# Patient Record
Sex: FEMALE | Race: WHITE | Employment: OTHER | URBAN - METROPOLITAN AREA
[De-identification: names, ages, dates, MRNs, and addresses within clinical notes are randomized per-mention and may not be internally consistent; named-entity substitution may affect disease eponyms.]

---

## 2019-10-18 ENCOUNTER — HOSPITAL ENCOUNTER (EMERGENCY)
Facility: HOSPITAL | Age: 59
Discharge: HOME | End: 2019-10-18
Attending: EMERGENCY MEDICINE
Payer: COMMERCIAL

## 2019-10-18 VITALS
WEIGHT: 220 LBS | OXYGEN SATURATION: 98 % | HEART RATE: 76 BPM | BODY MASS INDEX: 34.53 KG/M2 | RESPIRATION RATE: 16 BRPM | DIASTOLIC BLOOD PRESSURE: 99 MMHG | SYSTOLIC BLOOD PRESSURE: 154 MMHG | TEMPERATURE: 98 F | HEIGHT: 67 IN

## 2019-10-18 DIAGNOSIS — H57.12 PAIN OF LEFT EYE: Primary | ICD-10-CM

## 2019-10-18 PROCEDURE — 99283 EMERGENCY DEPT VISIT LOW MDM: CPT

## 2019-10-18 RX ORDER — ERYTHROMYCIN 5 MG/G
1 OINTMENT OPHTHALMIC EVERY 6 HOURS
Qty: 3.5 G | Refills: 0 | Status: SHIPPED | OUTPATIENT
Start: 2019-10-18 | End: 2019-10-22

## 2019-10-18 RX ORDER — TETRACAINE HYDROCHLORIDE 5 MG/ML
1-2 SOLUTION OPHTHALMIC ONCE
Status: COMPLETED | OUTPATIENT
Start: 2019-10-18 | End: 2019-10-18

## 2019-10-18 RX ORDER — TETRACAINE HYDROCHLORIDE 5 MG/ML
SOLUTION OPHTHALMIC
Status: COMPLETED
Start: 2019-10-18 | End: 2019-10-18

## 2019-10-19 NOTE — ED INITIAL ASSESSMENT (HPI)
Pt was taking contact out of left eye and scratched her eye with her fingernail. Pt states pain, sensitivity to light and \"feels like something is in my eye\".

## 2019-10-19 NOTE — ED PROVIDER NOTES
Patient Seen in: [facility] Emergency Department      History   Patient presents with:   Eye Visual Problem (opthalmic)    Stated Complaint: scratched eye    HPI    CHIEF COMPLAINT: Left eye pain    HISTORY OF PRESENT ILLNESS: Patient is a 59-year-ol Acuity: Recorded by the nurses and reviewed. 20/25 bilaterally, corrected    Left eye: No periorbital edema, ecchymosis, erythema or induration present. The lids show no evidence of laceration or stye.  The upper and lower lids were everted and there was n comfortable going home.               Disposition and Plan     Clinical Impression:  Pain of left eye  (primary encounter diagnosis)    Disposition:  Discharge  10/18/2019  7:48 pm    Follow-up:  MD Santa Monique

## 2019-10-19 NOTE — ED PROVIDER NOTES
I reviewed that chart and discussed the case. I have examined the patient and noted 51-year-old female that states she thinks she scratched her left eye while taking out her contact. The contact was intact.   She did this just prior to arrival.  She is co

## 2020-01-27 PROBLEM — I10 HYPERTENSION, UNCONTROLLED: Status: ACTIVE | Noted: 2020-01-27

## 2020-01-27 PROBLEM — Z82.49 FAMILY HISTORY OF EARLY CAD: Status: ACTIVE | Noted: 2020-01-27

## 2020-01-27 PROBLEM — E66.9 OBESITY (BMI 30-39.9): Status: ACTIVE | Noted: 2020-01-27

## 2024-09-16 ENCOUNTER — APPOINTMENT (OUTPATIENT)
Dept: CT IMAGING | Facility: HOSPITAL | Age: 64
End: 2024-09-16
Attending: EMERGENCY MEDICINE
Payer: COMMERCIAL

## 2024-09-16 ENCOUNTER — HOSPITAL ENCOUNTER (INPATIENT)
Facility: HOSPITAL | Age: 64
LOS: 2 days | Discharge: HOME | End: 2024-09-19
Attending: EMERGENCY MEDICINE | Admitting: HOSPITALIST
Payer: COMMERCIAL

## 2024-09-16 DIAGNOSIS — R10.9 INTRACTABLE ABDOMINAL PAIN: ICD-10-CM

## 2024-09-16 DIAGNOSIS — K80.20 GALLSTONES: Primary | ICD-10-CM

## 2024-09-16 DIAGNOSIS — N20.0 KIDNEY STONE: ICD-10-CM

## 2024-09-16 DIAGNOSIS — N23 RENAL COLIC: ICD-10-CM

## 2024-09-16 LAB
ALBUMIN SERPL-MCNC: 4.9 G/DL (ref 3.2–4.8)
ALBUMIN/GLOB SERPL: 1.5 {RATIO} (ref 1–2)
ALP LIVER SERPL-CCNC: 92 U/L
ALT SERPL-CCNC: 12 U/L
ANION GAP SERPL CALC-SCNC: 9 MMOL/L (ref 0–18)
AST SERPL-CCNC: 23 U/L (ref ?–34)
BASOPHILS # BLD AUTO: 0.07 X10(3) UL (ref 0–0.2)
BASOPHILS NFR BLD AUTO: 0.5 %
BILIRUB SERPL-MCNC: 0.5 MG/DL (ref 0.2–1.1)
BUN BLD-MCNC: 15 MG/DL (ref 9–23)
CALCIUM BLD-MCNC: 11 MG/DL (ref 8.7–10.4)
CHLORIDE SERPL-SCNC: 105 MMOL/L (ref 98–112)
CO2 SERPL-SCNC: 26 MMOL/L (ref 21–32)
CREAT BLD-MCNC: 1.16 MG/DL
EGFRCR SERPLBLD CKD-EPI 2021: 53 ML/MIN/1.73M2 (ref 60–?)
EOSINOPHIL # BLD AUTO: 0.03 X10(3) UL (ref 0–0.7)
EOSINOPHIL NFR BLD AUTO: 0.2 %
ERYTHROCYTE [DISTWIDTH] IN BLOOD BY AUTOMATED COUNT: 12.9 %
GLOBULIN PLAS-MCNC: 3.2 G/DL (ref 2–3.5)
GLUCOSE BLD-MCNC: 145 MG/DL (ref 70–99)
HCT VFR BLD AUTO: 42.2 %
HGB BLD-MCNC: 14.6 G/DL
IMM GRANULOCYTES # BLD AUTO: 0.05 X10(3) UL (ref 0–1)
IMM GRANULOCYTES NFR BLD: 0.4 %
LIPASE SERPL-CCNC: 48 U/L (ref 12–53)
LYMPHOCYTES # BLD AUTO: 1.06 X10(3) UL (ref 1–4)
LYMPHOCYTES NFR BLD AUTO: 7.7 %
MCH RBC QN AUTO: 30.7 PG (ref 26–34)
MCHC RBC AUTO-ENTMCNC: 34.6 G/DL (ref 31–37)
MCV RBC AUTO: 88.8 FL
MONOCYTES # BLD AUTO: 0.45 X10(3) UL (ref 0.1–1)
MONOCYTES NFR BLD AUTO: 3.3 %
NEUTROPHILS # BLD AUTO: 12.13 X10 (3) UL (ref 1.5–7.7)
NEUTROPHILS # BLD AUTO: 12.13 X10(3) UL (ref 1.5–7.7)
NEUTROPHILS NFR BLD AUTO: 87.9 %
OSMOLALITY SERPL CALC.SUM OF ELEC: 293 MOSM/KG (ref 275–295)
PLATELET # BLD AUTO: 247 10(3)UL (ref 150–450)
POTASSIUM SERPL-SCNC: 4.1 MMOL/L (ref 3.5–5.1)
PROT SERPL-MCNC: 8.1 G/DL (ref 5.7–8.2)
RBC # BLD AUTO: 4.75 X10(6)UL
SODIUM SERPL-SCNC: 140 MMOL/L (ref 136–145)
WBC # BLD AUTO: 13.8 X10(3) UL (ref 4–11)

## 2024-09-16 PROCEDURE — 83690 ASSAY OF LIPASE: CPT | Performed by: EMERGENCY MEDICINE

## 2024-09-16 PROCEDURE — 85025 COMPLETE CBC W/AUTO DIFF WBC: CPT | Performed by: EMERGENCY MEDICINE

## 2024-09-16 PROCEDURE — 83690 ASSAY OF LIPASE: CPT

## 2024-09-16 PROCEDURE — 80053 COMPREHEN METABOLIC PANEL: CPT | Performed by: EMERGENCY MEDICINE

## 2024-09-16 PROCEDURE — 96361 HYDRATE IV INFUSION ADD-ON: CPT

## 2024-09-16 PROCEDURE — 96374 THER/PROPH/DIAG INJ IV PUSH: CPT

## 2024-09-16 PROCEDURE — 96375 TX/PRO/DX INJ NEW DRUG ADDON: CPT

## 2024-09-16 PROCEDURE — 99285 EMERGENCY DEPT VISIT HI MDM: CPT

## 2024-09-16 PROCEDURE — 85025 COMPLETE CBC W/AUTO DIFF WBC: CPT

## 2024-09-16 PROCEDURE — 80053 COMPREHEN METABOLIC PANEL: CPT

## 2024-09-16 PROCEDURE — 74177 CT ABD & PELVIS W/CONTRAST: CPT | Performed by: EMERGENCY MEDICINE

## 2024-09-16 RX ORDER — ONDANSETRON 2 MG/ML
4 INJECTION INTRAMUSCULAR; INTRAVENOUS ONCE
Status: DISCONTINUED | OUTPATIENT
Start: 2024-09-16 | End: 2024-09-19

## 2024-09-16 RX ORDER — HYDROMORPHONE HYDROCHLORIDE 1 MG/ML
0.5 INJECTION, SOLUTION INTRAMUSCULAR; INTRAVENOUS; SUBCUTANEOUS ONCE
Status: COMPLETED | OUTPATIENT
Start: 2024-09-16 | End: 2024-09-16

## 2024-09-16 RX ORDER — ONDANSETRON 2 MG/ML
4 INJECTION INTRAMUSCULAR; INTRAVENOUS ONCE
Status: COMPLETED | OUTPATIENT
Start: 2024-09-16 | End: 2024-09-16

## 2024-09-17 ENCOUNTER — ANESTHESIA (OUTPATIENT)
Dept: SURGERY | Facility: HOSPITAL | Age: 64
End: 2024-09-17
Payer: COMMERCIAL

## 2024-09-17 ENCOUNTER — ANESTHESIA EVENT (OUTPATIENT)
Dept: SURGERY | Facility: HOSPITAL | Age: 64
End: 2024-09-17
Payer: COMMERCIAL

## 2024-09-17 ENCOUNTER — APPOINTMENT (OUTPATIENT)
Dept: GENERAL RADIOLOGY | Facility: HOSPITAL | Age: 64
End: 2024-09-17
Attending: UROLOGY
Payer: COMMERCIAL

## 2024-09-17 PROBLEM — K80.20 GALLSTONES: Status: ACTIVE | Noted: 2024-09-17

## 2024-09-17 PROBLEM — R10.9 INTRACTABLE ABDOMINAL PAIN: Status: ACTIVE | Noted: 2024-09-17

## 2024-09-17 PROBLEM — N20.0 KIDNEY STONE: Status: ACTIVE | Noted: 2024-09-17

## 2024-09-17 PROBLEM — N23 RENAL COLIC: Status: ACTIVE | Noted: 2024-09-17

## 2024-09-17 LAB
ALBUMIN SERPL-MCNC: 4 G/DL (ref 3.2–4.8)
ALBUMIN/GLOB SERPL: 1.5 {RATIO} (ref 1–2)
ALP LIVER SERPL-CCNC: 75 U/L
ALT SERPL-CCNC: 23 U/L
ANION GAP SERPL CALC-SCNC: 9 MMOL/L (ref 0–18)
AST SERPL-CCNC: 33 U/L (ref ?–34)
BILIRUB SERPL-MCNC: 0.5 MG/DL (ref 0.2–1.1)
BILIRUB UR QL STRIP.AUTO: NEGATIVE
BUN BLD-MCNC: 15 MG/DL (ref 9–23)
CALCIUM BLD-MCNC: 9.5 MG/DL (ref 8.7–10.4)
CHLORIDE SERPL-SCNC: 105 MMOL/L (ref 98–112)
CLARITY UR REFRACT.AUTO: CLEAR
CO2 SERPL-SCNC: 25 MMOL/L (ref 21–32)
CREAT BLD-MCNC: 1.38 MG/DL
EGFRCR SERPLBLD CKD-EPI 2021: 43 ML/MIN/1.73M2 (ref 60–?)
ERYTHROCYTE [DISTWIDTH] IN BLOOD BY AUTOMATED COUNT: 13.2 %
GLOBULIN PLAS-MCNC: 2.6 G/DL (ref 2–3.5)
GLUCOSE BLD-MCNC: 132 MG/DL (ref 70–99)
GLUCOSE UR STRIP.AUTO-MCNC: NORMAL MG/DL
HCT VFR BLD AUTO: 37 %
HGB BLD-MCNC: 12.8 G/DL
KETONES UR STRIP.AUTO-MCNC: NEGATIVE MG/DL
LEUKOCYTE ESTERASE UR QL STRIP.AUTO: 25
MAGNESIUM SERPL-MCNC: 1.6 MG/DL (ref 1.6–2.6)
MCH RBC QN AUTO: 30.8 PG (ref 26–34)
MCHC RBC AUTO-ENTMCNC: 34.6 G/DL (ref 31–37)
MCV RBC AUTO: 88.9 FL
OSMOLALITY SERPL CALC.SUM OF ELEC: 291 MOSM/KG (ref 275–295)
PH UR STRIP.AUTO: 6.5 [PH] (ref 5–8)
PLATELET # BLD AUTO: 185 10(3)UL (ref 150–450)
POTASSIUM SERPL-SCNC: 4 MMOL/L (ref 3.5–5.1)
PROT SERPL-MCNC: 6.6 G/DL (ref 5.7–8.2)
PROT UR STRIP.AUTO-MCNC: 20 MG/DL
RBC # BLD AUTO: 4.16 X10(6)UL
RBC #/AREA URNS AUTO: >10 /HPF
SODIUM SERPL-SCNC: 139 MMOL/L (ref 136–145)
SP GR UR STRIP.AUTO: >1.03 (ref 1–1.03)
UROBILINOGEN UR STRIP.AUTO-MCNC: NORMAL MG/DL
WBC # BLD AUTO: 17.6 X10(3) UL (ref 4–11)

## 2024-09-17 PROCEDURE — 87186 SC STD MICRODIL/AGAR DIL: CPT | Performed by: EMERGENCY MEDICINE

## 2024-09-17 PROCEDURE — 96375 TX/PRO/DX INJ NEW DRUG ADDON: CPT

## 2024-09-17 PROCEDURE — 87040 BLOOD CULTURE FOR BACTERIA: CPT | Performed by: HOSPITALIST

## 2024-09-17 PROCEDURE — 87077 CULTURE AEROBIC IDENTIFY: CPT | Performed by: EMERGENCY MEDICINE

## 2024-09-17 PROCEDURE — 81001 URINALYSIS AUTO W/SCOPE: CPT | Performed by: EMERGENCY MEDICINE

## 2024-09-17 PROCEDURE — 80053 COMPREHEN METABOLIC PANEL: CPT | Performed by: HOSPITALIST

## 2024-09-17 PROCEDURE — 87086 URINE CULTURE/COLONY COUNT: CPT | Performed by: EMERGENCY MEDICINE

## 2024-09-17 PROCEDURE — 83735 ASSAY OF MAGNESIUM: CPT | Performed by: HOSPITALIST

## 2024-09-17 PROCEDURE — 87088 URINE BACTERIA CULTURE: CPT | Performed by: EMERGENCY MEDICINE

## 2024-09-17 PROCEDURE — 0T768DZ DILATION OF RIGHT URETER WITH INTRALUMINAL DEVICE, VIA NATURAL OR ARTIFICIAL OPENING ENDOSCOPIC: ICD-10-PCS | Performed by: UROLOGY

## 2024-09-17 PROCEDURE — 96376 TX/PRO/DX INJ SAME DRUG ADON: CPT

## 2024-09-17 PROCEDURE — 76000 FLUOROSCOPY <1 HR PHYS/QHP: CPT | Performed by: UROLOGY

## 2024-09-17 PROCEDURE — 85027 COMPLETE CBC AUTOMATED: CPT | Performed by: HOSPITALIST

## 2024-09-17 DEVICE — URETERAL STENT
Type: IMPLANTABLE DEVICE | Site: URETER | Status: FUNCTIONAL
Brand: ASCERTA™

## 2024-09-17 RX ORDER — PROCHLORPERAZINE EDISYLATE 5 MG/ML
5 INJECTION INTRAMUSCULAR; INTRAVENOUS EVERY 8 HOURS PRN
Status: DISCONTINUED | OUTPATIENT
Start: 2024-09-17 | End: 2024-09-19

## 2024-09-17 RX ORDER — ACETAMINOPHEN 500 MG
500 TABLET ORAL EVERY 4 HOURS PRN
Status: DISCONTINUED | OUTPATIENT
Start: 2024-09-17 | End: 2024-09-19

## 2024-09-17 RX ORDER — ONDANSETRON 2 MG/ML
4 INJECTION INTRAMUSCULAR; INTRAVENOUS EVERY 6 HOURS PRN
Status: DISCONTINUED | OUTPATIENT
Start: 2024-09-17 | End: 2024-09-19

## 2024-09-17 RX ORDER — HYDRALAZINE HYDROCHLORIDE 20 MG/ML
10 INJECTION INTRAMUSCULAR; INTRAVENOUS EVERY 6 HOURS PRN
Status: DISCONTINUED | OUTPATIENT
Start: 2024-09-17 | End: 2024-09-19

## 2024-09-17 RX ORDER — HYDROMORPHONE HYDROCHLORIDE 1 MG/ML
0.5 INJECTION, SOLUTION INTRAMUSCULAR; INTRAVENOUS; SUBCUTANEOUS ONCE
Status: COMPLETED | OUTPATIENT
Start: 2024-09-17 | End: 2024-09-17

## 2024-09-17 RX ORDER — HYDROCODONE BITARTRATE AND ACETAMINOPHEN 5; 325 MG/1; MG/1
2 TABLET ORAL ONCE AS NEEDED
Status: DISCONTINUED | OUTPATIENT
Start: 2024-09-17 | End: 2024-09-17 | Stop reason: HOSPADM

## 2024-09-17 RX ORDER — KETAMINE HYDROCHLORIDE 50 MG/ML
INJECTION, SOLUTION INTRAMUSCULAR; INTRAVENOUS AS NEEDED
Status: DISCONTINUED | OUTPATIENT
Start: 2024-09-17 | End: 2024-09-17 | Stop reason: SURG

## 2024-09-17 RX ORDER — SODIUM PHOSPHATE, DIBASIC AND SODIUM PHOSPHATE, MONOBASIC 7; 19 G/230ML; G/230ML
1 ENEMA RECTAL ONCE AS NEEDED
Status: DISCONTINUED | OUTPATIENT
Start: 2024-09-17 | End: 2024-09-19

## 2024-09-17 RX ORDER — HYDROCODONE BITARTRATE AND ACETAMINOPHEN 5; 325 MG/1; MG/1
1 TABLET ORAL EVERY 4 HOURS PRN
Status: DISCONTINUED | OUTPATIENT
Start: 2024-09-17 | End: 2024-09-19

## 2024-09-17 RX ORDER — SODIUM CHLORIDE, SODIUM LACTATE, POTASSIUM CHLORIDE, CALCIUM CHLORIDE 600; 310; 30; 20 MG/100ML; MG/100ML; MG/100ML; MG/100ML
INJECTION, SOLUTION INTRAVENOUS CONTINUOUS PRN
Status: DISCONTINUED | OUTPATIENT
Start: 2024-09-17 | End: 2024-09-17 | Stop reason: SURG

## 2024-09-17 RX ORDER — MAGNESIUM OXIDE 400 MG/1
400 TABLET ORAL ONCE
Status: COMPLETED | OUTPATIENT
Start: 2024-09-17 | End: 2024-09-17

## 2024-09-17 RX ORDER — NALOXONE HYDROCHLORIDE 0.4 MG/ML
0.08 INJECTION, SOLUTION INTRAMUSCULAR; INTRAVENOUS; SUBCUTANEOUS AS NEEDED
Status: DISCONTINUED | OUTPATIENT
Start: 2024-09-17 | End: 2024-09-17 | Stop reason: HOSPADM

## 2024-09-17 RX ORDER — LIDOCAINE HYDROCHLORIDE 20 MG/ML
JELLY TOPICAL AS NEEDED
Status: DISCONTINUED | OUTPATIENT
Start: 2024-09-17 | End: 2024-09-17 | Stop reason: HOSPADM

## 2024-09-17 RX ORDER — HYDROCODONE BITARTRATE AND ACETAMINOPHEN 5; 325 MG/1; MG/1
1 TABLET ORAL ONCE AS NEEDED
Status: DISCONTINUED | OUTPATIENT
Start: 2024-09-17 | End: 2024-09-17 | Stop reason: HOSPADM

## 2024-09-17 RX ORDER — LIDOCAINE HYDROCHLORIDE 10 MG/ML
INJECTION, SOLUTION EPIDURAL; INFILTRATION; INTRACAUDAL; PERINEURAL AS NEEDED
Status: DISCONTINUED | OUTPATIENT
Start: 2024-09-17 | End: 2024-09-17 | Stop reason: SURG

## 2024-09-17 RX ORDER — HYDROMORPHONE HYDROCHLORIDE 1 MG/ML
0.5 INJECTION, SOLUTION INTRAMUSCULAR; INTRAVENOUS; SUBCUTANEOUS
Status: DISCONTINUED | OUTPATIENT
Start: 2024-09-17 | End: 2024-09-19

## 2024-09-17 RX ORDER — SODIUM CHLORIDE 9 MG/ML
INJECTION, SOLUTION INTRAVENOUS CONTINUOUS
Status: DISCONTINUED | OUTPATIENT
Start: 2024-09-17 | End: 2024-09-19

## 2024-09-17 RX ORDER — MIDAZOLAM HYDROCHLORIDE 1 MG/ML
INJECTION INTRAMUSCULAR; INTRAVENOUS AS NEEDED
Status: DISCONTINUED | OUTPATIENT
Start: 2024-09-17 | End: 2024-09-17 | Stop reason: SURG

## 2024-09-17 RX ORDER — HEPARIN SODIUM 5000 [USP'U]/ML
5000 INJECTION, SOLUTION INTRAVENOUS; SUBCUTANEOUS EVERY 8 HOURS SCHEDULED
Status: DISCONTINUED | OUTPATIENT
Start: 2024-09-17 | End: 2024-09-18

## 2024-09-17 RX ORDER — HYDROMORPHONE HYDROCHLORIDE 1 MG/ML
0.6 INJECTION, SOLUTION INTRAMUSCULAR; INTRAVENOUS; SUBCUTANEOUS EVERY 5 MIN PRN
Status: DISCONTINUED | OUTPATIENT
Start: 2024-09-17 | End: 2024-09-17 | Stop reason: HOSPADM

## 2024-09-17 RX ORDER — SENNOSIDES 8.6 MG
17.2 TABLET ORAL NIGHTLY PRN
Status: DISCONTINUED | OUTPATIENT
Start: 2024-09-17 | End: 2024-09-19

## 2024-09-17 RX ORDER — ONDANSETRON 2 MG/ML
4 INJECTION INTRAMUSCULAR; INTRAVENOUS EVERY 4 HOURS PRN
Status: ACTIVE | OUTPATIENT
Start: 2024-09-17 | End: 2024-09-17

## 2024-09-17 RX ORDER — DIPHENHYDRAMINE HYDROCHLORIDE 50 MG/ML
12.5 INJECTION INTRAMUSCULAR; INTRAVENOUS AS NEEDED
Status: DISCONTINUED | OUTPATIENT
Start: 2024-09-17 | End: 2024-09-17 | Stop reason: HOSPADM

## 2024-09-17 RX ORDER — BISACODYL 10 MG
10 SUPPOSITORY, RECTAL RECTAL
Status: DISCONTINUED | OUTPATIENT
Start: 2024-09-17 | End: 2024-09-19

## 2024-09-17 RX ORDER — HYDROMORPHONE HYDROCHLORIDE 1 MG/ML
0.4 INJECTION, SOLUTION INTRAMUSCULAR; INTRAVENOUS; SUBCUTANEOUS EVERY 5 MIN PRN
Status: DISCONTINUED | OUTPATIENT
Start: 2024-09-17 | End: 2024-09-17 | Stop reason: HOSPADM

## 2024-09-17 RX ORDER — HYDROMORPHONE HYDROCHLORIDE 1 MG/ML
1 INJECTION, SOLUTION INTRAMUSCULAR; INTRAVENOUS; SUBCUTANEOUS ONCE
Status: DISCONTINUED | OUTPATIENT
Start: 2024-09-17 | End: 2024-09-17

## 2024-09-17 RX ORDER — PROCHLORPERAZINE EDISYLATE 5 MG/ML
5 INJECTION INTRAMUSCULAR; INTRAVENOUS EVERY 8 HOURS PRN
Status: DISCONTINUED | OUTPATIENT
Start: 2024-09-17 | End: 2024-09-17 | Stop reason: HOSPADM

## 2024-09-17 RX ORDER — ONDANSETRON 2 MG/ML
4 INJECTION INTRAMUSCULAR; INTRAVENOUS EVERY 6 HOURS PRN
Status: DISCONTINUED | OUTPATIENT
Start: 2024-09-17 | End: 2024-09-17 | Stop reason: HOSPADM

## 2024-09-17 RX ORDER — SODIUM CHLORIDE, SODIUM LACTATE, POTASSIUM CHLORIDE, CALCIUM CHLORIDE 600; 310; 30; 20 MG/100ML; MG/100ML; MG/100ML; MG/100ML
INJECTION, SOLUTION INTRAVENOUS CONTINUOUS
Status: DISCONTINUED | OUTPATIENT
Start: 2024-09-17 | End: 2024-09-17 | Stop reason: HOSPADM

## 2024-09-17 RX ORDER — ONDANSETRON 2 MG/ML
INJECTION INTRAMUSCULAR; INTRAVENOUS AS NEEDED
Status: DISCONTINUED | OUTPATIENT
Start: 2024-09-17 | End: 2024-09-17 | Stop reason: SURG

## 2024-09-17 RX ORDER — LABETALOL HYDROCHLORIDE 5 MG/ML
20 INJECTION, SOLUTION INTRAVENOUS ONCE
Status: DISCONTINUED | OUTPATIENT
Start: 2024-09-17 | End: 2024-09-19

## 2024-09-17 RX ORDER — DEXAMETHASONE SODIUM PHOSPHATE 4 MG/ML
VIAL (ML) INJECTION AS NEEDED
Status: DISCONTINUED | OUTPATIENT
Start: 2024-09-17 | End: 2024-09-17 | Stop reason: SURG

## 2024-09-17 RX ORDER — ALBUTEROL SULFATE 90 UG/1
2 INHALANT RESPIRATORY (INHALATION) EVERY 4 HOURS PRN
Status: DISCONTINUED | OUTPATIENT
Start: 2024-09-17 | End: 2024-09-19

## 2024-09-17 RX ORDER — LISINOPRIL 10 MG/1
10 TABLET ORAL DAILY
Status: DISCONTINUED | OUTPATIENT
Start: 2024-09-17 | End: 2024-09-19

## 2024-09-17 RX ORDER — ACETAMINOPHEN 500 MG
1000 TABLET ORAL ONCE AS NEEDED
Status: DISCONTINUED | OUTPATIENT
Start: 2024-09-17 | End: 2024-09-17 | Stop reason: HOSPADM

## 2024-09-17 RX ORDER — HYDROMORPHONE HYDROCHLORIDE 1 MG/ML
0.5 INJECTION, SOLUTION INTRAMUSCULAR; INTRAVENOUS; SUBCUTANEOUS EVERY 30 MIN PRN
Status: ACTIVE | OUTPATIENT
Start: 2024-09-17 | End: 2024-09-17

## 2024-09-17 RX ORDER — SODIUM CHLORIDE 9 MG/ML
INJECTION, SOLUTION INTRAVENOUS CONTINUOUS
Status: ACTIVE | OUTPATIENT
Start: 2024-09-17 | End: 2024-09-17

## 2024-09-17 RX ORDER — CEFAZOLIN SODIUM 1 G/3ML
INJECTION, POWDER, FOR SOLUTION INTRAMUSCULAR; INTRAVENOUS AS NEEDED
Status: DISCONTINUED | OUTPATIENT
Start: 2024-09-17 | End: 2024-09-17 | Stop reason: SURG

## 2024-09-17 RX ORDER — MIDAZOLAM HYDROCHLORIDE 1 MG/ML
1 INJECTION INTRAMUSCULAR; INTRAVENOUS EVERY 5 MIN PRN
Status: DISCONTINUED | OUTPATIENT
Start: 2024-09-17 | End: 2024-09-17 | Stop reason: HOSPADM

## 2024-09-17 RX ORDER — POLYETHYLENE GLYCOL 3350 17 G/17G
17 POWDER, FOR SOLUTION ORAL DAILY PRN
Status: DISCONTINUED | OUTPATIENT
Start: 2024-09-17 | End: 2024-09-19

## 2024-09-17 RX ORDER — HYDROMORPHONE HYDROCHLORIDE 1 MG/ML
0.2 INJECTION, SOLUTION INTRAMUSCULAR; INTRAVENOUS; SUBCUTANEOUS EVERY 5 MIN PRN
Status: DISCONTINUED | OUTPATIENT
Start: 2024-09-17 | End: 2024-09-17 | Stop reason: HOSPADM

## 2024-09-17 RX ADMIN — KETAMINE HYDROCHLORIDE 25 MG: 50 INJECTION, SOLUTION INTRAMUSCULAR; INTRAVENOUS at 18:07:00

## 2024-09-17 RX ADMIN — SODIUM CHLORIDE, SODIUM LACTATE, POTASSIUM CHLORIDE, CALCIUM CHLORIDE: 600; 310; 30; 20 INJECTION, SOLUTION INTRAVENOUS at 18:29:00

## 2024-09-17 RX ADMIN — LIDOCAINE HYDROCHLORIDE 5 ML: 10 INJECTION, SOLUTION EPIDURAL; INFILTRATION; INTRACAUDAL; PERINEURAL at 18:07:00

## 2024-09-17 RX ADMIN — DEXAMETHASONE SODIUM PHOSPHATE 4 MG: 4 MG/ML VIAL (ML) INJECTION at 18:10:00

## 2024-09-17 RX ADMIN — CEFAZOLIN SODIUM 2 G: 1 INJECTION, POWDER, FOR SOLUTION INTRAMUSCULAR; INTRAVENOUS at 18:15:00

## 2024-09-17 RX ADMIN — SODIUM CHLORIDE, SODIUM LACTATE, POTASSIUM CHLORIDE, CALCIUM CHLORIDE: 600; 310; 30; 20 INJECTION, SOLUTION INTRAVENOUS at 18:00:00

## 2024-09-17 RX ADMIN — ONDANSETRON 4 MG: 2 INJECTION INTRAMUSCULAR; INTRAVENOUS at 18:18:00

## 2024-09-17 RX ADMIN — MIDAZOLAM HYDROCHLORIDE 2 MG: 1 INJECTION INTRAMUSCULAR; INTRAVENOUS at 18:00:00

## 2024-09-17 NOTE — ANESTHESIA PREPROCEDURE EVALUATION
PRE-OP EVALUATION    Patient Name: Michael Boston    Admit Diagnosis: Renal colic [N23]  Kidney stone [N20.0]  Gallstones [K80.20]  Intractable abdominal pain [R10.9]    Pre-op Diagnosis: Hydronephrosis [N13.30]    CYSTOSCOPY, RIGHT URETEROSCOPY, RIGHT URETERAL STENT PLACEMENT    Anesthesia Procedure: CYSTOSCOPY, RIGHT URETEROSCOPY, RIGHT URETERAL STENT PLACEMENT (Right)    Surgeons and Role:     * Jose Bolden MD - Primary    Pre-op vitals reviewed.  Temp: 100.6 °F (38.1 °C)  Pulse: 87  Resp: 18  BP: 140/79  SpO2: 95 %  Body mass index is 37.43 kg/m².    Current medications reviewed.  Hospital Medications:  • [COMPLETED] HYDROmorphone (Dilaudid) 1 MG/ML injection 0.5 mg  0.5 mg Intravenous Once   • [] sodium chloride 0.9% infusion   Intravenous Continuous   • [] HYDROmorphone (Dilaudid) 1 MG/ML injection 0.5 mg  0.5 mg Intravenous Q30 Min PRN   • [] ondansetron (Zofran) 4 MG/2ML injection 4 mg  4 mg Intravenous Q4H PRN   • [COMPLETED] cefTRIAXone (Rocephin) 2 g in sodium chloride 0.9% 100 mL IVPB-ADDV  2 g Intravenous Once   • [Transfer Hold] labetalol (Trandate) 5 mg/mL injection 20 mg  20 mg Intravenous Once   • sodium chloride 0.9% infusion   Intravenous Continuous   • [Held by provider] heparin (Porcine) 5000 UNIT/ML injection 5,000 Units  5,000 Units Subcutaneous Q8H MARYBETH   • [Transfer Hold] acetaminophen (Tylenol Extra Strength) tab 500 mg  500 mg Oral Q4H PRN   • [Transfer Hold] polyethylene glycol (PEG 3350) (Miralax) 17 g oral packet 17 g  17 g Oral Daily PRN   • [Transfer Hold] sennosides (Senokot) tab 17.2 mg  17.2 mg Oral Nightly PRN   • [Transfer Hold] bisacodyl (Dulcolax) 10 MG rectal suppository 10 mg  10 mg Rectal Daily PRN   • [Transfer Hold] fleet enema (Fleet) rectal enema 133 mL  1 enema Rectal Once PRN   • [Transfer Hold] ondansetron (Zofran) 4 MG/2ML injection 4 mg  4 mg Intravenous Q6H PRN   • [Transfer Hold] prochlorperazine (Compazine) 10 MG/2ML injection 5 mg  5  mg Intravenous Q8H PRN   • [Transfer Hold] hydrALAzine (Apresoline) 20 mg/mL injection 10 mg  10 mg Intravenous Q6H PRN   • [Transfer Hold] HYDROcodone-acetaminophen (Norco) 5-325 MG per tab 1 tablet  1 tablet Oral Q4H PRN   • [Transfer Hold] HYDROmorphone (Dilaudid) 1 MG/ML injection 0.5 mg  0.5 mg Intravenous Q3H PRN   • [Transfer Hold] albuterol (Ventolin HFA) 108 (90 Base) MCG/ACT inhaler 2 puff  2 puff Inhalation Q4H PRN   • [Transfer Hold] lisinopril (Zestril) tab 10 mg  10 mg Oral Daily   • cefTRIAXone (Rocephin) 2 g in sodium chloride 0.9% 100 mL IVPB-ADDV  2 g Intravenous Q24H   • [COMPLETED] magnesium oxide (Mag-Ox) tab 400 mg  400 mg Oral Once   • lidocaine (Urojet) 2 % urethral jelly    PRN   • [COMPLETED] ondansetron (Zofran) 4 MG/2ML injection 4 mg  4 mg Intravenous Once   • [COMPLETED] HYDROmorphone (Dilaudid) 1 MG/ML injection 0.5 mg  0.5 mg Intravenous Once   • [COMPLETED] sodium chloride 0.9 % IV bolus 1,000 mL  1,000 mL Intravenous Once   • [Transfer Hold] ondansetron (Zofran) 4 MG/2ML injection 4 mg  4 mg Intravenous Once   • [COMPLETED] iopamidol 76% (ISOVUE-370) injection for power injector  100 mL Intravenous ONCE PRN       Outpatient Medications:     Medications Prior to Admission   Medication Sig Dispense Refill Last Dose   • Fluticasone Propionate HFA (FLOVENT HFA IN) Inhale into the lungs.   Taking   • lisinopril 10 MG Oral Tab Take 1 tablet (10 mg total) by mouth daily. 90 tablet 3 9/16/2024   • Albuterol Sulfate  (90 Base) MCG/ACT Inhalation Aero Soln Inhale 2 puffs into the lungs every 4 (four) hours as needed for Wheezing or Shortness of Breath. 1 Inhaler 1 Past Week       Allergies: Patient has no known allergies.      Anesthesia Evaluation    Patient summary reviewed.    Anesthetic Complications  (-) history of anesthetic complications         GI/Hepatic/Renal    Negative GI/hepatic/renal ROS.                             Cardiovascular        Exercise tolerance: good     MET:  >4    (+) obesity  (+) hypertension                                     Endo/Other    Negative endo/other ROS.                              Pulmonary    Negative pulmonary ROS.                       Neuro/Psych    Negative neuro/psych ROS.                              Past Surgical History:   Procedure Laterality Date   •       x3     Social History     Socioeconomic History   • Marital status:    Tobacco Use   • Smoking status: Never   • Smokeless tobacco: Never   Vaping Use   • Vaping status: Never Used   Substance and Sexual Activity   • Alcohol use: No   • Drug use: No     History   Drug Use No     Available pre-op labs reviewed.  Lab Results   Component Value Date    WBC 17.6 (H) 2024    RBC 4.16 2024    HGB 12.8 2024    HCT 37.0 2024    MCV 88.9 2024    MCH 30.8 2024    MCHC 34.6 2024    RDW 13.2 2024    .0 2024     Lab Results   Component Value Date     2024    K 4.0 2024     2024    CO2 25.0 2024    BUN 15 2024    CREATSERUM 1.38 (H) 2024     (H) 2024    CA 9.5 2024            Airway      Mallampati: III  Mouth opening: >3 FB  TM distance: > 6 cm   Cardiovascular    Cardiovascular exam normal.         Dental             Pulmonary    Pulmonary exam normal.                 Other findings        ASA: 2   Plan: MAC  NPO status verified and patient meets guidelines.          Plan/risks discussed with: patient            Present on Admission:  **None**

## 2024-09-17 NOTE — ED INITIAL ASSESSMENT (HPI)
Pt arrives ambulatory with c/o RLQ pain that wraps around to flank. Pt states pain started yesterday but was manageable up until about an hr ago. +N/V. Last BM this morning. Denies urinary symptoms.

## 2024-09-17 NOTE — ED PROVIDER NOTES
Patient Seen in: [facility] Emergency Department      History     Chief Complaint   Patient presents with    Abdomen/Flank Pain     Stated Complaint: abd pain    Subjective:   HPI    This is a 64-year-old female who presents with right lower quadrant pain started about a week ago that was mild and was aggressively gotten worse.  The patient stated that was manageable until about an hour ago when the pain just got worse.  The pain is in the right side.  Denies any dysuria diarrhea the pain is in her right lower quadrant rating to her flank..  The patient has no dysuria no fevers does have Struve hypertension.  Pain is moderate to severe in nature.  She has had previous 3 C-sections before.    Objective:   Past Medical History:    Essential hypertension    HYPERTENSION              Past Surgical History:   Procedure Laterality Date          x3                Social History     Socioeconomic History    Marital status:    Tobacco Use    Smoking status: Never    Smokeless tobacco: Never   Vaping Use    Vaping status: Never Used   Substance and Sexual Activity    Alcohol use: No    Drug use: No              Review of Systems    Positive for stated Chief Complaint: Abdomen/Flank Pain    Other systems are as noted in HPI.  Constitutional and vital signs reviewed.      All other systems reviewed and negative except as noted above.    Physical Exam     ED Triage Vitals [24]   BP (!) 177/96   Pulse 69   Resp 16   Temp 97.8 °F (36.6 °C)   Temp src Oral   SpO2 99 %   O2 Device None (Room air)       Current Vitals:   Vital Signs  BP: (!) 181/93  Pulse: 86  Resp: 19  Temp: 97.8 °F (36.6 °C)  Temp src: Oral  MAP (mmHg): (!) 118    Oxygen Therapy  SpO2: 99 %  O2 Device: None (Room air)            Physical Exam  General: .  Patient is in some moderate discomfort secondary to pain tender in the right lower quadrant moderately tender  The patient is in no respiratory distress    HEENT: Atraumatic,  conjunctiva are not pale.  There is no icterus.  Oral mucosa Is wet.  No facial trauma.  The neck is supple.    LUNGS: Clear to auscultation, there is no wheezing or retraction.  No crackles.    CV: Cardiovascular is regular without murmurs or rubs.    ABD: The abdomen is soft nondistended n moderately tender in the right lower quadrant there is no rebound.  There is no guarding.  There is no rebound, guarding.  There is no CVA tenderness.    EXT: There is good pulses bilaterally.  There is no calf tenderness.  There is no rash noted.  There is no edema    NEURO: Alert and oriented x4.  Muscle strength and sensory exam is grossly normal.  And the patient is neurologically intact with no focal findings.         ED Course     Labs Reviewed   COMP METABOLIC PANEL (14) - Abnormal; Notable for the following components:       Result Value    Glucose 145 (*)     Creatinine 1.16 (*)     Calcium, Total 11.0 (*)     eGFR-Cr 53 (*)     Albumin 4.9 (*)     All other components within normal limits   CBC WITH DIFFERENTIAL WITH PLATELET - Abnormal; Notable for the following components:    WBC 13.8 (*)     Neutrophil Absolute Prelim 12.13 (*)     Neutrophil Absolute 12.13 (*)     All other components within normal limits   URINALYSIS WITH CULTURE REFLEX - Abnormal; Notable for the following components:    Spec Gravity >1.030 (*)     Blood Urine 2+ (*)     Protein Urine 20 (*)     Nitrite Urine 1+ (*)     Leukocyte Esterase Urine 25 (*)     WBC Urine 21-50 (*)     RBC Urine >10 (*)     Bacteria Urine Rare (*)     Squamous Epi. Cells Few (*)     All other components within normal limits   LIPASE - Normal   URINE CULTURE, ROUTINE             The patient was placed on monitors, IV was started, blood was drawn.       Workup was done to rule out obstruction, perforation, kidney stones, appendicitis or right even right-sided diverticulitis.  MDM      The patient CBC shows a white count 13.8 comprehensive was grossly negative lipase is  normal.  Urinalysis was ordered CT of the abdomen is ordered.  Admission disposition: 9/17/2024 12:24 AM                   I personally reviewed the radiographs and my individual interpretation shows    Findings of a large gallbladder, gallstones, but an obstructive kidney stone about 1 cm proximal with hydro noted.    Also reviewed official report and it shows       CT ABDOMEN+PELVIS(CONTRAST ONLY)(CPT=74177)    Result Date: 9/17/2024  PROCEDURE:  CT ABDOMEN+PELVIS (CONTRAST ONLY) (CPT=74177)  COMPARISON:  None.  INDICATIONS:  Right-sided abdominal pain.  TECHNIQUE:  CT scanning was performed from the dome of the diaphragm to the pubic symphysis with non-ionic intravenous contrast material. Post contrast coronal MPR imaging was performed.  Dose reduction techniques were used. Dose information is transmitted to the ACR (American College of Radiology) NRDR (National Radiology Data Registry) which includes the Dose Index Registry.  PATIENT STATED HISTORY:(As transcribed by Technologist)  Pt states right sided abd pain.   CONTRAST USED:  100cc of Isovue 370  FINDINGS:  LIVER:  No enlargement, atrophy, abnormal density, or significant focal lesion.  BILIARY:  There is a distended gallbladder with 2 prominent laminated gallstones near the gallbladder neck, the largest of which measures 2 cm.  No discrete evidence of gallbladder wall thickening, acute cholecystitis or biliary ductal dilatation. PANCREAS:  No lesion, fluid collection, ductal dilatation, or atrophy.  SPLEEN:  No enlargement or focal lesion.  KIDNEYS:  There is moderate right-sided hydronephrosis with perinephric fat stranding secondary to a large, 1 cm obstructing stone which is seen approximately at the level of the ureteropelvic junction..  There is a simple nonenhancing cyst along the superior pole the right kidney measuring 1.3 cm.  This is consistent with a benign cyst and no further workup is required or recommended.  Normal appearance of the left  kidney and collecting system. ADRENALS:  No mass or enlargement.  AORTA/VASCULAR:  No aneurysm or dissection.  RETROPERITONEUM:  No mass or adenopathy.  BOWEL/MESENTERY:  No visible mass, obstruction, or bowel wall thickening.  Normal appendix.  Uncomplicated diverticular changes of the sigmoid colon and descending colon. ABDOMINAL WALL:  No mass or hernia.  URINARY BLADDER:  No visible focal wall thickening, lesion, or calculus.  PELVIC NODES:  No adenopathy.  PELVIC ORGANS:  No visible mass.  Pelvic organs appropriate for patient age.  BONES:  No bony lesion or fracture.  LUNG BASES:  No visible pulmonary or pleural disease.  OTHER:  Negative.             CONCLUSION:  1. Moderate right-sided hydronephrosis with diffuse perinephric fat stranding secondary to a 1 cm obstructing stone proximally at the level of the ureteropelvic junction. 2. There is a distended gallbladder with 2 prominent gallstones near the gallbladder neck, the largest of which measures 2 cm.  No gallbladder wall thickening or biliary ductal dilatation noted.  No discrete CT evidence of acute cholecystitis. 3. Uncomplicated diverticulosis of the descending and sigmoid colon.    LOCATION:  [facility]   Dictated by (CST): Kamilah Bhat DO on 9/17/2024 at 0:01 AM     Finalized by (CST): Kamilah Bhat DO on 9/17/2024 at 0:05 AM           The patient's CBC shows white count 13.8 comprehensive shows no findings of elevated liver enzymes, bilirubin is normal, lipase is normal, urine did show findings of a urinary tract infection.  Although the nitrate was only 1+ there were still white cells there was red blood cells noted no gross blood bleeding was noted the patient was given IV fluids, IV pain meds, IV antibiotics.  I discussed this case with the urologist Dr. Gore also discussed this case with the [facility] hospitalist Dr. Sloan.  The patient will be admitted for further evaluation treatment she was given IV antibiotics, she will be kept NPO.  I did go  back and reexamined her abdomen is mild tenderness there is no rebound, guarding.  She is feeling somewhat better after IV fluids, IV antibiotics.     Reexamined her abdomen is completely soft no rebound no guarding no surgical signs she does not look septic or toxic no fever here.  Heart rate is come down significantly..  She feels much better.  I reexamined her abdomen is completely soft nontender there is no rebound, guarding.  She did take her blood pressure medicines this morning.  May need extra dose before she goes upstairs.    Medical Decision Making      Disposition and Plan     Clinical Impression:  1. Gallstones    2. Renal colic    3. Intractable abdominal pain    4. Kidney stone         Disposition:  Admit  9/17/2024 12:24 am    Follow-up:  No follow-up provider specified.        Medications Prescribed:  Current Discharge Medication List                            Hospital Problems       Present on Admission  Date Reviewed: 1/18/2022            ICD-10-CM Noted POA    * (Principal) Gallstones K80.20 9/17/2024 Unknown

## 2024-09-17 NOTE — ANESTHESIA POSTPROCEDURE EVALUATION
[facility]    Michael Boston Patient Status:  Inpatient   Age/Gender 64 year old female MRN [id number]   Location [facility] POST ANESTHESIA CARE UNIT Attending Marvin Horner MD   Hosp Day # 0 PCP Joe Saunders MD       Anesthesia Post-op Note    CYSTOSCOPY, RIGHT URETEROSCOPY, RIGHT URETERAL STENT PLACEMENT    Procedure Summary       Date: 09/17/24 Room / Location:  MAIN OR  /  MAIN OR    Anesthesia Start: 1800 Anesthesia Stop: 1840    Procedure: CYSTOSCOPY, RIGHT URETEROSCOPY, RIGHT URETERAL STENT PLACEMENT (Right: Ureter) Diagnosis:       Hydronephrosis      (Hydronephrosis [N13.30])    Surgeons: Jose Bolden MD Anesthesiologist: Cielo Sanders MD    Anesthesia Type: MAC ASA Status: 2            Anesthesia Type: MAC    Vitals Value Taken Time   /74 09/17/24 1851   Temp 97.3 °F (36.3 °C) 09/17/24 1840   Pulse 78 09/17/24 1854   Resp 23 09/17/24 1854   SpO2 94 % 09/17/24 1854   Vitals shown include unfiled device data.    Patient Location: PACU    Anesthesia Type: MAC    Airway Patency: patent    Postop Pain Control: adequate    Mental Status: preanesthetic baseline    Nausea/Vomiting: none    Cardiopulmonary/Hydration status: stable euvolemic    Complications: no apparent anesthesia related complications    Postop vital signs: stable    Dental Exam: Unchanged from Preop    Patient to be discharged home when criteria met.

## 2024-09-17 NOTE — CONSULTS
[facility]   part of [facility]    Report of Consultation    Michael Boston Patient Status:  Inpatient    1960 MRN [id number]   Location [facility] 3NW-A Attending Marvin Horner MD   Hosp Day # 0 PCP Joe Saunders MD     Date of Admission:  2024  Date of Consult:  2024  Reason for Consultation:   hydronephrosis    History of Present Illness:   Patient is a 64 year old female who was admitted to the hospital for abdominal pain  CT proving moderate right-sided hydronephrosis with diffuse perinephric fat stranding secondary to a 1 cm obstructing stone proximally at the level of the ureteropelvic junction     Creatinine 1.38 (baseline 0.98)  WBC 17.6  Low grade fevers, emesis yesterday, nausea today, pain fairly controlled with meds  No prior h/o nephrolithiasis     Past Medical History  Past Medical History:    Essential hypertension    HYPERTENSION       Past Surgical History  Past Surgical History:   Procedure Laterality Date          x3       Family History  Family History   Problem Relation Age of Onset    Heart Disorder Mother         CAD - quadruple bypass in 40s    Other (Other) Mother         tobacco use    Other (Other) Sister 37        CVA - heavy smoker    Breast Cancer Other         M Aunt dx 60's    Other (Other) Sister         substance abuse       Social History  Social History     Socioeconomic History    Marital status:    Tobacco Use    Smoking status: Never    Smokeless tobacco: Never   Vaping Use    Vaping status: Never Used   Substance and Sexual Activity    Alcohol use: No    Drug use: No     Social Determinants of Health     Food Insecurity: No Food Insecurity (2024)    Food Insecurity     Food Insecurity: Never true   Transportation Needs: No Transportation Needs (2024)    Transportation Needs     Lack of Transportation: No   Housing Stability: Low Risk  (2024)    Housing Stability     Housing Instability: No        Current  Medications:  Current Facility-Administered Medications   Medication Dose Route Frequency    labetalol (Trandate) 5 mg/mL injection 20 mg  20 mg Intravenous Once    sodium chloride 0.9% infusion   Intravenous Continuous    [Held by provider] heparin (Porcine) 5000 UNIT/ML injection 5,000 Units  5,000 Units Subcutaneous Q8H MARYBETH    acetaminophen (Tylenol Extra Strength) tab 500 mg  500 mg Oral Q4H PRN    polyethylene glycol (PEG 3350) (Miralax) 17 g oral packet 17 g  17 g Oral Daily PRN    sennosides (Senokot) tab 17.2 mg  17.2 mg Oral Nightly PRN    bisacodyl (Dulcolax) 10 MG rectal suppository 10 mg  10 mg Rectal Daily PRN    fleet enema (Fleet) rectal enema 133 mL  1 enema Rectal Once PRN    ondansetron (Zofran) 4 MG/2ML injection 4 mg  4 mg Intravenous Q6H PRN    prochlorperazine (Compazine) 10 MG/2ML injection 5 mg  5 mg Intravenous Q8H PRN    hydrALAzine (Apresoline) 20 mg/mL injection 10 mg  10 mg Intravenous Q6H PRN    HYDROcodone-acetaminophen (Norco) 5-325 MG per tab 1 tablet  1 tablet Oral Q4H PRN    HYDROmorphone (Dilaudid) 1 MG/ML injection 0.5 mg  0.5 mg Intravenous Q3H PRN    albuterol (Ventolin HFA) 108 (90 Base) MCG/ACT inhaler 2 puff  2 puff Inhalation Q4H PRN    lisinopril (Zestril) tab 10 mg  10 mg Oral Daily    cefTRIAXone (Rocephin) 2 g in sodium chloride 0.9% 100 mL IVPB-ADDV  2 g Intravenous Q24H    ondansetron (Zofran) 4 MG/2ML injection 4 mg  4 mg Intravenous Once     Medications Prior to Admission   Medication Sig    Fluticasone Propionate HFA (FLOVENT HFA IN) Inhale into the lungs.    lisinopril 10 MG Oral Tab Take 1 tablet (10 mg total) by mouth daily.    Albuterol Sulfate  (90 Base) MCG/ACT Inhalation Aero Soln Inhale 2 puffs into the lungs every 4 (four) hours as needed for Wheezing or Shortness of Breath.       Allergies  No Known Allergies    Review of Systems:    Pertinent items are noted in HPI.    Physical Exam:   Blood pressure 133/76, pulse 93, temperature 100.4 °F (38 °C),  temperature source Oral, resp. rate 18, height 5' 7\" (1.702 m), weight 239 lb (108.4 kg), SpO2 94%.    GENERAL APPEARANCE: a well-developed, well-nourished, in no apparent distress.   PSYCH: A&O x 3  LUNGS: non labored breathing  HEENT: NC/AT, EOMI, trachea midline, normal external ears, nares patent  ABDOMEN: soft, no masses/HSM, mild RLQ tenderness, no rebound  CVA: right CVA tenderness   exam deferred until time of cystoscopy  LE: No clubbing/cyanosis/edema      Results:     Laboratory Data:  Lab Results   Component Value Date    WBC 17.6 (H) 09/17/2024    HGB 12.8 09/17/2024    HCT 37.0 09/17/2024    .0 09/17/2024    CREATSERUM 1.38 (H) 09/17/2024    BUN 15 09/17/2024     09/17/2024    K 4.0 09/17/2024     09/17/2024    CO2 25.0 09/17/2024     (H) 09/17/2024    CA 9.5 09/17/2024    ALB 4.0 09/17/2024    ALKPHO 75 09/17/2024    TP 6.6 09/17/2024    AST 33 09/17/2024    ALT 23 09/17/2024    TSH 1.354 01/27/2020    LIP 48 09/16/2024    MG 1.6 09/17/2024         Imaging:  CT ABDOMEN+PELVIS(CONTRAST ONLY)(CPT=74177)    Result Date: 9/17/2024  CONCLUSION:  1. Moderate right-sided hydronephrosis with diffuse perinephric fat stranding secondary to a 1 cm obstructing stone proximally at the level of the ureteropelvic junction. 2. There is a distended gallbladder with 2 prominent gallstones near the gallbladder neck, the largest of which measures 2 cm.  No gallbladder wall thickening or biliary ductal dilatation noted.  No discrete CT evidence of acute cholecystitis. 3. Uncomplicated diverticulosis of the descending and sigmoid colon.    LOCATION:  [facility]   Dictated by (CST): Kamilah Bhat DO on 9/17/2024 at 0:01 AM     Finalized by (CST): Kamilah Bhat DO on 9/17/2024 at 0:05 AM           Impression:   RIGHT UPJ STONE  FLANK PAIN  EMESIS  PETRA  LEUKOCYTOSIS  POSITIVE U/A    Cultures pending  Empiric antibiotics, supportive care, fluids  To OR today for stent (only)  Will plan staged lithotripsy  once infection clear - outpatient  NPO   Consent       Thank you for allowing me to participate in the care of your patient.    Brandy Villalobos PA-C  9/17/2024

## 2024-09-17 NOTE — H&P
[facility] H&P/Consult note       CC:   Chief Complaint   Patient presents with    Abdomen/Flank Pain        PCP: Joe Saunders MD    History of Present Illness: Patient is a 64 year old female with PMH sig for HTN, HLD, here for abd pain   Has had symptoms for the past few days on and off but constant and more svere since last night. R sided abd pain with associated n/v. No dysuria. No known fevers at home but was febrile here. Pain was not associated with meals.   In ER was noted to have R uretel stone with hydronephrosis    PMH  Past Medical History:    Essential hypertension    HYPERTENSION        PSH  Past Surgical History:   Procedure Laterality Date          x3        ALL:  No Known Allergies     Home Medications:  Outpatient Medications Marked as Taking for the 24 encounter (Hospital Encounter)   Medication Sig Dispense Refill    Fluticasone Propionate HFA (FLOVENT HFA IN) Inhale into the lungs.      lisinopril 10 MG Oral Tab Take 1 tablet (10 mg total) by mouth daily. 90 tablet 3    Albuterol Sulfate  (90 Base) MCG/ACT Inhalation Aero Soln Inhale 2 puffs into the lungs every 4 (four) hours as needed for Wheezing or Shortness of Breath. 1 Inhaler 1         Soc Hx  Social History     Tobacco Use    Smoking status: Never    Smokeless tobacco: Never   Substance Use Topics    Alcohol use: No        Fam Hx  Family History   Problem Relation Age of Onset    Heart Disorder Mother         CAD - quadruple bypass in 40s    Other (Other) Mother         tobacco use    Other (Other) Sister 37        CVA - heavy smoker    Breast Cancer Other         M Aunt dx 60's    Other (Other) Sister         substance abuse       Review of Systems  Comprehensive ROS reviewed and negative except for what's stated above.      OBJECTIVE:  /78 (BP Location: Right arm)   Pulse 88   Temp 100.3 °F (37.9 °C) (Oral)   Resp 18   Ht 5' 7\" (1.702 m)   Wt 239 lb (108.4 kg)   SpO2 95%   BMI 37.43 kg/m²    General:  Alert, no distress, appears stated age.   Head:  Normocephalic, without obvious abnormality, atraumatic.   Eyes:  Sclera anicteric, No conjunctival pallor, EOMs intact.    Throat: MMM   Neck: Supple    Lungs:   Clear to auscultation bilaterally. Normal effort   Chest wall:  No tenderness or deformity.   Heart:  Regular rate and rhythm,    Abdomen:   Soft, ttp in R sided, CVA ttp on R noted as well   Extremities: Atraumatic, no cyanosis or edema.   Skin: No visible rashes or lesions.    Neurologic: Normal strength, no focal deficit appreciated     Diagnostic Data:    CBC/Chem  Recent Labs   Lab 09/16/24 2143   WBC 13.8*   HGB 14.6   MCV 88.8   .0       Recent Labs   Lab 09/16/24 2143      K 4.1      CO2 26.0   BUN 15   CREATSERUM 1.16*   *   CA 11.0*       Recent Labs   Lab 09/16/24 2143   ALT 12   AST 23   ALB 4.9*       No results for input(s): \"TROP\" in the last 168 hours.       Radiology: CT ABDOMEN+PELVIS(CONTRAST ONLY)(CPT=74177)    Result Date: 9/17/2024  PROCEDURE:  CT ABDOMEN+PELVIS (CONTRAST ONLY) (CPT=74177)  COMPARISON:  None.  INDICATIONS:  Right-sided abdominal pain.  TECHNIQUE:  CT scanning was performed from the dome of the diaphragm to the pubic symphysis with non-ionic intravenous contrast material. Post contrast coronal MPR imaging was performed.  Dose reduction techniques were used. Dose information is transmitted to the ACR (American College of Radiology) NRDR (National Radiology Data Registry) which includes the Dose Index Registry.  PATIENT STATED HISTORY:(As transcribed by Technologist)  Pt states right sided abd pain.   CONTRAST USED:  100cc of Isovue 370  FINDINGS:  LIVER:  No enlargement, atrophy, abnormal density, or significant focal lesion.  BILIARY:  There is a distended gallbladder with 2 prominent laminated gallstones near the gallbladder neck, the largest of which measures 2 cm.  No discrete evidence of gallbladder wall thickening, acute  cholecystitis or biliary ductal dilatation. PANCREAS:  No lesion, fluid collection, ductal dilatation, or atrophy.  SPLEEN:  No enlargement or focal lesion.  KIDNEYS:  There is moderate right-sided hydronephrosis with perinephric fat stranding secondary to a large, 1 cm obstructing stone which is seen approximately at the level of the ureteropelvic junction..  There is a simple nonenhancing cyst along the superior pole the right kidney measuring 1.3 cm.  This is consistent with a benign cyst and no further workup is required or recommended.  Normal appearance of the left kidney and collecting system. ADRENALS:  No mass or enlargement.  AORTA/VASCULAR:  No aneurysm or dissection.  RETROPERITONEUM:  No mass or adenopathy.  BOWEL/MESENTERY:  No visible mass, obstruction, or bowel wall thickening.  Normal appendix.  Uncomplicated diverticular changes of the sigmoid colon and descending colon. ABDOMINAL WALL:  No mass or hernia.  URINARY BLADDER:  No visible focal wall thickening, lesion, or calculus.  PELVIC NODES:  No adenopathy.  PELVIC ORGANS:  No visible mass.  Pelvic organs appropriate for patient age.  BONES:  No bony lesion or fracture.  LUNG BASES:  No visible pulmonary or pleural disease.  OTHER:  Negative.             CONCLUSION:  1. Moderate right-sided hydronephrosis with diffuse perinephric fat stranding secondary to a 1 cm obstructing stone proximally at the level of the ureteropelvic junction. 2. There is a distended gallbladder with 2 prominent gallstones near the gallbladder neck, the largest of which measures 2 cm.  No gallbladder wall thickening or biliary ductal dilatation noted.  No discrete CT evidence of acute cholecystitis. 3. Uncomplicated diverticulosis of the descending and sigmoid colon.    LOCATION:  [facility]   Dictated by (CST): Kamilah Bhat DO on 9/17/2024 at 0:01 AM     Finalized by (CST): Kamilah Bhat DO on 9/17/2024 at 0:05 AM          ASSESSMENT / PLAN:     Patient is a 64 year old  female with PMH sig for HTN, HLD, here for abd pain     Impression    -R 1cm obstructing ureteral stone with hydronephrosis  -R sided abd pain  -gall stones  -pyuria  -fever    -HLD  -HTN  -asthma  -obesity bmi 37    Plan    *  -urology consult  -npo   -ivf  -strain urine    *GI  -gallstones noted on CT, no cholecystitis on imaging  -LFTs normal, will repeat  -does have R sided abd pain but suspect more related to stone rather than GI pathology but if has ongoing pain after  intervention may need additional work up     *ID  -pyuria noted. Likely fever from urinary source   -ucx pending  -check blood cx (had already received abx)  -trend cbc    *chronic conditions  -home meds as able      Further recommendations pending patient's clinical course. [facility] hospitalist to continue to follow patient while in house    Patient and/or patient's family given opportunity to ask questions and note understanding and agreeing with therapeutic plan as outlined    Oziel Kahn Hospitalist  [phone]  Answering Service: [phone]

## 2024-09-17 NOTE — PLAN OF CARE
Patient alert and oriented x4, vital signs stable on room air. BP elevated, hydralazine for SBP >180 on. Pain controlled with prn pain medication, NPO with sips. Up with standby to void. Mild nausea reported. Safety measures in place, questions addressed, plan of care discussed with patient.    2 person skin check performed per protocol, no breakdown noted.    NURSING ADMISSION NOTE      Patient admitted via Cart  Oriented to room.  Safety precautions initiated.  Bed in low position.  Call light in reach.    0145: Pt arrived to floor in stable condition

## 2024-09-17 NOTE — PLAN OF CARE
Patient is alert and oriented. On room air. Patient c/o flank pain. Dilaudid given. Patient denies nausea. Replacing magnesium per protocol. Patient is currently NPO pending urology consult. Call light within reach.

## 2024-09-18 LAB
ALBUMIN SERPL-MCNC: 3.7 G/DL (ref 3.2–4.8)
ALBUMIN/GLOB SERPL: 1.4 {RATIO} (ref 1–2)
ALP LIVER SERPL-CCNC: 73 U/L
ALT SERPL-CCNC: 38 U/L
ANION GAP SERPL CALC-SCNC: 6 MMOL/L (ref 0–18)
AST SERPL-CCNC: 37 U/L (ref ?–34)
BILIRUB SERPL-MCNC: 0.4 MG/DL (ref 0.2–1.1)
BUN BLD-MCNC: 15 MG/DL (ref 9–23)
CALCIUM BLD-MCNC: 9.3 MG/DL (ref 8.7–10.4)
CHLORIDE SERPL-SCNC: 108 MMOL/L (ref 98–112)
CO2 SERPL-SCNC: 26 MMOL/L (ref 21–32)
CREAT BLD-MCNC: 1.12 MG/DL
EGFRCR SERPLBLD CKD-EPI 2021: 55 ML/MIN/1.73M2 (ref 60–?)
ERYTHROCYTE [DISTWIDTH] IN BLOOD BY AUTOMATED COUNT: 13.2 %
GLOBULIN PLAS-MCNC: 2.6 G/DL (ref 2–3.5)
GLUCOSE BLD-MCNC: 130 MG/DL (ref 70–99)
HCT VFR BLD AUTO: 34.2 %
HGB BLD-MCNC: 11.7 G/DL
MAGNESIUM SERPL-MCNC: 2 MG/DL (ref 1.6–2.6)
MCH RBC QN AUTO: 31.2 PG (ref 26–34)
MCHC RBC AUTO-ENTMCNC: 34.2 G/DL (ref 31–37)
MCV RBC AUTO: 91.2 FL
OSMOLALITY SERPL CALC.SUM OF ELEC: 293 MOSM/KG (ref 275–295)
PLATELET # BLD AUTO: 126 10(3)UL (ref 150–450)
POTASSIUM SERPL-SCNC: 4.1 MMOL/L (ref 3.5–5.1)
PROT SERPL-MCNC: 6.3 G/DL (ref 5.7–8.2)
RBC # BLD AUTO: 3.75 X10(6)UL
SODIUM SERPL-SCNC: 140 MMOL/L (ref 136–145)
WBC # BLD AUTO: 13.1 X10(3) UL (ref 4–11)

## 2024-09-18 PROCEDURE — 85027 COMPLETE CBC AUTOMATED: CPT | Performed by: UROLOGY

## 2024-09-18 PROCEDURE — 83735 ASSAY OF MAGNESIUM: CPT | Performed by: UROLOGY

## 2024-09-18 PROCEDURE — 80053 COMPREHEN METABOLIC PANEL: CPT | Performed by: UROLOGY

## 2024-09-18 RX ORDER — ENOXAPARIN SODIUM 100 MG/ML
40 INJECTION SUBCUTANEOUS DAILY
Status: DISCONTINUED | OUTPATIENT
Start: 2024-09-18 | End: 2024-09-19

## 2024-09-18 RX ORDER — PHENAZOPYRIDINE HYDROCHLORIDE 100 MG/1
100 TABLET, FILM COATED ORAL 3 TIMES DAILY PRN
Status: DISCONTINUED | OUTPATIENT
Start: 2024-09-18 | End: 2024-09-19

## 2024-09-18 NOTE — BRIEF OP NOTE
Pre-Operative Diagnosis: Hydronephrosis [N13.30], left ureteral calculi, left flank pain     Post-Operative Diagnosis: Hydronephrosis [N13.30], left ureteral calculi, left flank pain     Procedure Performed:   CYSTOSCOPY, RIGHT URETEROSCOPY, RIGHT URETERAL STENT PLACEMENT, fluoroscopy    Surgeons and Role:     * Jose Bolden MD - Primary    Assistant(s):        Surgical Findings: successful stent placement     Specimen: none     Estimated Blood Loss: None        Jose Bolden MD  9/17/2024  7:00 PM

## 2024-09-18 NOTE — OPERATIVE REPORT
[facility]   part of [facility]    Operative Note         Michael Boston Location: OR   [facility] [id number] MRN [id number]   Admission Date 9/16/2024 Operation Date 9/17/2024   Attending Physician Marvin Horner MD       Patient Name: Michael Boston     Preoperative Diagnosis: Hydronephrosis [N13.30]     Postoperative Diagnosis: Hydronephrosis [N13.30]     Procedure(s):  CYSTOSCOPY, RIGHT URETEROSCOPY, RIGHT URETERAL STENT PLACEMENT     Primary Surgeon: Jose Bolden MD           Anesthesia: General     History: First episode of acute right ureteral colic.  Patient with fevers and evidence of a urinary tract infection as well as right proximal ureteral obstruction with hydronephrosis.    Operative Summary: Patient was prepped and draped in a sterile fashion IV placed in a dorsolithotomy position after undergoing successful administration of general general anesthesia while supine.  A 21 Russian cystoscope advanced to the urethra into the bladder.  There was abnormal urine present consistent with urinary tract infection.  The bladder mucosa showed some mild inflammation no mass or lesion.  The right ureteral orifice was catheterized with a 5 Russian open-ended catheter and a right retrograde pyelogram was performed noting the obstructing proximal ureteral stone.  A 0.38 Glidewire was then introduced through the open-ended catheter beyond the obstructing calculus into the intrarenal collecting system successfully.  The open-ended catheter was then withdrawn.  A 6 Russian 26 cm double-J stent was advanced over the Glidewire with a nice coil achieved once advanced into the intrarenal pelvis.  A nice coil of the stent was also visualized in the bladder after removal of the Glidewire.  There was purulent urine seen exiting around the stent.  The bladder was evacuated of irrigant and the patient was then transferred to the recovery room in good condition.    Estimated Blood Loss: No data recorded     Implants:    Implant Name Type Inv. Item Serial No.  Lot No. LRB No. Used Action   STENT URO [id number] PGTL SFT HYDRPHLC COAT - SNA  STENT URO [id number] PGTL SFT HYDRPHLC COAT NA [employer]  [id number] Right 1 Implanted        Specimen: None*     Drains: 6 [place] 26 cm stent    Condition: Good    Complications: none         Jose Bolden MD

## 2024-09-18 NOTE — DISCHARGE INSTRUCTIONS
Ms. Boston,    Thank you for allowing us to take care of your health during your admission at [facility]. You are stable for discharge at this time. Your diagnoses and specific instructions for your medications are listed below. Please ensure you follow up with your PCP in 1-2 weeks on discharge and all other follow up appointments listed below.    Diagnoses: 1cm Kidney Stone on R requiring Cystoscopy and stent placement    Changes to Medications:  - Continue Bactrim 1 tablet twice daily for 12 more days as per Urology recommendations    Follow Ups:  - Urology follow up as recommended    Best Wishes and Good Jason Moran D.O.  [facility]

## 2024-09-18 NOTE — PLAN OF CARE
NURSING ADMISSION NOTE      Patient admitted via Cart  Oriented to room.  Safety precautions initiated.  Bed in low position.  Call light in reach.    1950: Pt arrived to floor from PACU in stable condition    Patient alert and oriented x4, vital signs stable on room air. Tolerating regular diet, no pain or nausea. IV fluids infusing, up ad bibi to void. Safety measures in place, questions addressed, plan of care discussed with patient.

## 2024-09-18 NOTE — PROGRESS NOTES
[facility]   part of [facility]    Progress Note    Michael Boston Patient Status:  Inpatient    1960 MRN [id number]   Location [facility] 3NW-A Attending Marvin Horner MD   Hosp Day # 1 PCP Joe Saunders MD     Subjective:   Michael Boston is a(n) 64 year old female s/p cystoscopy and right ureteral stent placement on  with Dr. Bolden. This was done due to an obstructing 1cm right UPJ calculus with hydronephrosis and a possible UTI.     Tmax 100.6.  She is on ceftriaxone.  WBC 13.1k this AM. Creatinine 1.12.     Urine culture is pending.     Some stent-related symptoms.    Objective:   Blood pressure 145/87, pulse 67, temperature 98.8 °F (37.1 °C), temperature source Oral, resp. rate 18, height 5' 7\" (1.702 m), weight 239 lb (108.4 kg), SpO2 98%.    General appearance:  alert, appears stated age, and cooperative  Head: Normocephalic, without obvious abnormality, atraumatic  Eyes: EOMI  Abdominal: soft, non-tender; bowel sounds normal; no masses,  no organomegaly  Neurologic: Grossly normal  Psychiatric: calm    Results:   Lab Results   Component Value Date    WBC 13.1 (H) 2024    HGB 11.7 (L) 2024    HCT 34.2 (L) 2024    .0 (L) 2024    CREATSERUM 1.12 (H) 2024    BUN 15 2024     2024    K 4.1 2024     2024    CO2 26.0 2024     (H) 2024    CA 9.3 2024    ALB 3.7 2024    ALKPHO 73 2024    BILT 0.4 2024    TP 6.3 2024    AST 37 (H) 2024    ALT 38 2024    TSH 1.354 2020    LIP 48 2024    MG 2.0 2024       XR FLUOROSCOPY C-ARM TIME LESS THAN 1 HOUR (CPT=76000)    Result Date: 2024    LOCATION:  [facility]    Dictated by (CST): Gigi Chun MD on 2024 at 6:46 PM     Finalized by (CST): Gigi Chun MD on 2024 at 6:46 PM       CT ABDOMEN+PELVIS(CONTRAST ONLY)(CPT=74177)    Result Date: 2024  CONCLUSION:  1. Moderate  right-sided hydronephrosis with diffuse perinephric fat stranding secondary to a 1 cm obstructing stone proximally at the level of the ureteropelvic junction. 2. There is a distended gallbladder with 2 prominent gallstones near the gallbladder neck, the largest of which measures 2 cm.  No gallbladder wall thickening or biliary ductal dilatation noted.  No discrete CT evidence of acute cholecystitis. 3. Uncomplicated diverticulosis of the descending and sigmoid colon.    LOCATION:  [facility]   Dictated by (CST): Kamilah Bhat DO on 9/17/2024 at 0:01 AM     Finalized by (CST): Kamilah Bhat DO on 9/17/2024 at 0:05 AM            Assessment & Plan:   64 year old woman s/p cystoscopy and right ureteral stent placement on 9/17/2024 for an obstructing 1cm right UPJ calculus in the setting of a possible UTI.     Await urine culture results.  If positive, treat with 2 weeks of culture-specific antibiotics  We reviewed stent-related symptoms  She will need to follow-up with Dr. Bolden as an outpatient to schedule an outpatient right ureteroscopy and laser lithotripsy in the coming weeks for definitive stone treatment.     Trino Medley MD  9/18/2024

## 2024-09-18 NOTE — PROGRESS NOTES
CC: follow-up hospital admission kidney stone    SUBJECTIVE:  Interval History:     Feels much better  No nv  Toleraign po    OBJECTIVE:  Scheduled Meds:    enoxaparin  40 mg Subcutaneous Daily    labetalol  20 mg Intravenous Once    lisinopril  10 mg Oral Daily    cefTRIAXone  2 g Intravenous Q24H    ondansetron  4 mg Intravenous Once     Continuous Infusions:    sodium chloride 100 mL/hr at 09/17/24 1211     PRN Meds:   acetaminophen    polyethylene glycol (PEG 3350)    sennosides    bisacodyl    fleet enema    ondansetron    prochlorperazine    hydrALAzine    HYDROcodone-acetaminophen    HYDROmorphone    albuterol    PHYSICAL EXAM  Vital signs: Temp:  [97.3 °F (36.3 °C)-100.6 °F (38.1 °C)] 99.3 °F (37.4 °C)  Pulse:  [67-87] 73  Resp:  [16-23] 18  BP: (131-155)/(74-87) 155/80  SpO2:  [94 %-98 %] 95 %      GENERAL - NAD, AAO  EYES- sclera anicteric   HENT- normocephalic, OP - MMM  NECK - no JVD  CV- RRR  RESP - CTAB, normal resp effort  ABDOMEN- soft, mild ttp in R sided but much improved  EXT- no LE edema, DP pulses 2+ b/l  PSYCH - normal mentation/ normal affect    Data Review:   Labs:   Recent Labs   Lab 09/16/24 2143 09/17/24 0754 09/18/24  0547   WBC 13.8* 17.6* 13.1*   HGB 14.6 12.8 11.7*   MCV 88.8 88.9 91.2   .0 185.0 126.0*       Recent Labs   Lab 09/16/24 2143 09/17/24  0754 09/18/24  0547    139 140   K 4.1 4.0 4.1    105 108   CO2 26.0 25.0 26.0   BUN 15 15 15   CREATSERUM 1.16* 1.38* 1.12*   CA 11.0* 9.5 9.3   MG  --  1.6 2.0   * 132* 130*       Recent Labs   Lab 09/16/24 2143 09/17/24  0754 09/18/24  0547   ALT 12 23 38   AST 23 33 37*   ALB 4.9* 4.0 3.7       No results for input(s): \"PGLU\" in the last 168 hours.        ASSESSMENT/PLAN:    Patient is a 64 year old female with PMH sig for HTN, HLD, here for abd pain      Impression     -R 1cm obstructing ureteral stone with hydronephrosis  -R sided abd pain  -gall stones  -pyuria / UTI  -fever      -HLD  -HTN  -asthma  -obesity bmi 37     Plan     *  -urology consult  -sp cysto and stent placement 09/17     *GI  -gallstones noted on CT, no cholecystitis on imaging  -LFTs normal, will repeat - mild elevation of AST  -does have R sided abd pain but suspect more related to stone rather than GI pathology but if has ongoing pain after  intervention may need additional work up      *ID  -pyuria noted. Likely fever from urinary source   -ucx with Ecoli  -check blood cx (had already received abx)  -trend cbc     *chronic conditions  -home meds as able    Will continue to follow while hospitalized. Please page me or the on-call hospitalist with questions or concerns.    Oziel Kahn Hospitalist  [phone]  Answering Service: [phone]

## 2024-09-18 NOTE — PLAN OF CARE
Pt a&o x 4.   Pleasant & cooperative w/ care  Up ad bibi w/ steady gait.  Pt has good safety awareness  Reports minimal discomfort upon voiding.  Declines pain meds  Voiding clear yellow urine  Continue to strain all urine  Appetite good.  Denies n&v  Rocephin & IVF's  Plan of care:   waiting for C&S to finalize

## 2024-09-19 VITALS
TEMPERATURE: 100 F | HEART RATE: 75 BPM | WEIGHT: 239 LBS | RESPIRATION RATE: 16 BRPM | BODY MASS INDEX: 37.51 KG/M2 | DIASTOLIC BLOOD PRESSURE: 90 MMHG | SYSTOLIC BLOOD PRESSURE: 161 MMHG | HEIGHT: 67 IN | OXYGEN SATURATION: 95 %

## 2024-09-19 PROBLEM — R10.9 INTRACTABLE ABDOMINAL PAIN: Status: RESOLVED | Noted: 2024-09-17 | Resolved: 2024-09-19

## 2024-09-19 PROBLEM — N23 RENAL COLIC: Status: RESOLVED | Noted: 2024-09-17 | Resolved: 2024-09-19

## 2024-09-19 PROBLEM — N20.0 KIDNEY STONE: Status: RESOLVED | Noted: 2024-09-17 | Resolved: 2024-09-19

## 2024-09-19 LAB
ALBUMIN SERPL-MCNC: 3.2 G/DL (ref 3.2–4.8)
ALBUMIN/GLOB SERPL: 1.1 {RATIO} (ref 1–2)
ALP LIVER SERPL-CCNC: 73 U/L
ALT SERPL-CCNC: 38 U/L
ANION GAP SERPL CALC-SCNC: 7 MMOL/L (ref 0–18)
AST SERPL-CCNC: 36 U/L (ref ?–34)
BILIRUB SERPL-MCNC: 0.3 MG/DL (ref 0.2–1.1)
BUN BLD-MCNC: 13 MG/DL (ref 9–23)
CALCIUM BLD-MCNC: 9.1 MG/DL (ref 8.7–10.4)
CHLORIDE SERPL-SCNC: 108 MMOL/L (ref 98–112)
CO2 SERPL-SCNC: 26 MMOL/L (ref 21–32)
CREAT BLD-MCNC: 0.87 MG/DL
EGFRCR SERPLBLD CKD-EPI 2021: 74 ML/MIN/1.73M2 (ref 60–?)
ERYTHROCYTE [DISTWIDTH] IN BLOOD BY AUTOMATED COUNT: 13.1 %
GLOBULIN PLAS-MCNC: 3 G/DL (ref 2–3.5)
GLUCOSE BLD-MCNC: 116 MG/DL (ref 70–99)
HCT VFR BLD AUTO: 32.2 %
HGB BLD-MCNC: 11.3 G/DL
MAGNESIUM SERPL-MCNC: 1.9 MG/DL (ref 1.6–2.6)
MCH RBC QN AUTO: 31.7 PG (ref 26–34)
MCHC RBC AUTO-ENTMCNC: 35.1 G/DL (ref 31–37)
MCV RBC AUTO: 90.2 FL
OSMOLALITY SERPL CALC.SUM OF ELEC: 293 MOSM/KG (ref 275–295)
PLATELET # BLD AUTO: 141 10(3)UL (ref 150–450)
POTASSIUM SERPL-SCNC: 3.9 MMOL/L (ref 3.5–5.1)
PROT SERPL-MCNC: 6.2 G/DL (ref 5.7–8.2)
RBC # BLD AUTO: 3.57 X10(6)UL
SODIUM SERPL-SCNC: 141 MMOL/L (ref 136–145)
WBC # BLD AUTO: 9.3 X10(3) UL (ref 4–11)

## 2024-09-19 PROCEDURE — 85027 COMPLETE CBC AUTOMATED: CPT | Performed by: UROLOGY

## 2024-09-19 PROCEDURE — 80053 COMPREHEN METABOLIC PANEL: CPT | Performed by: UROLOGY

## 2024-09-19 PROCEDURE — 83735 ASSAY OF MAGNESIUM: CPT | Performed by: UROLOGY

## 2024-09-19 RX ORDER — SULFAMETHOXAZOLE AND TRIMETHOPRIM 400; 80 MG/1; MG/1
1 TABLET ORAL EVERY 12 HOURS SCHEDULED
Qty: 24 TABLET | Refills: 0 | Status: SHIPPED | OUTPATIENT
Start: 2024-09-19 | End: 2024-10-01

## 2024-09-19 RX ORDER — SULFAMETHOXAZOLE AND TRIMETHOPRIM 400; 80 MG/1; MG/1
1 TABLET ORAL EVERY 12 HOURS SCHEDULED
Status: DISCONTINUED | OUTPATIENT
Start: 2024-09-19 | End: 2024-09-19

## 2024-09-19 NOTE — PLAN OF CARE
Patient alert and oriented x4, vital signs stable on room air. Up ad bibi, tolerating diet, no pain or nausea. IV fluids infusing. Straining urine, awaiting culture results. Safety measures in place, questions addressed, plan of care discussed with patient. 2 person skin check performed with Ingrid YOUNG per protocol, no breakdown noted.

## 2024-09-19 NOTE — PROGRESS NOTES
[facility]  Progress Note    Michael Boston Patient Status:  Inpatient    1960 MRN [id number]   Location [facility] 3NW-A Attending Marvin Horner MD   Hosp Day # 2 PCP Joe Saunders MD     Subjective:  Michael Boston is a(n) 64 year old female.    Hospital course to date:   s/p cystoscopy and right ureteral stent placement on  with Dr. Bolden for obstructing 1cm right UPJ calculus with hydronephrosis and a possible UTI   On Ceftriaxone  urine culture pending - has two e.coli strains. One is resistant to nitrofurantoin    Current complaints: feels well, hoping to go home. Has some voiding pain. Reviewed activity level for discharge    Objective:  Temp (24hrs), Av.3 °F (37.4 °C), Min:98.9 °F (37.2 °C), Max:99.8 °F (37.7 °C)  BP (!) 161/90 (BP Location: Left arm)   Pulse 75   Temp 99.8 °F (37.7 °C) (Oral)   Resp 16   Ht 5' 7\" (1.702 m)   Wt 239 lb (108.4 kg)   SpO2 95%   BMI 37.43 kg/m²   ABD no acute tenderness  No CVAT  Voiding with some urinary pain, reviewed Azo, she has that at home  Declines Rx pain med needs    Laboratory Data:  Lab Results   Component Value Date    WBC 9.3 2024    HGB 11.3 2024    HCT 32.2 2024    .0 2024    CREATSERUM 0.87 2024    BUN 13 2024     2024    K 3.9 2024     2024    CO2 26.0 2024     2024    CA 9.1 2024    MG 1.9 2024       CBC:    Lab Results   Component Value Date    WBC 9.3 2024    WBC 13.1 (H) 2024    WBC 17.6 (H) 2024      HGB 11.3 (L) 2024    HGB 11.7 (L) 2024    HGB 12.8 2024      Lab Results   Component Value Date    .0 (L) 2024    .0 (L) 2024    .0 2024       Kidney:    Lab Results   Component Value Date    BUN 13 2024    BUN 15 2024    BUN 15 2024     Lab Results   Component Value Date    CREATSERUM 0.87 2024    CREATSERUM 1.12 (H)  09/18/2024    CREATSERUM 1.38 (H) 09/17/2024      XR FLUOROSCOPY C-ARM TIME LESS THAN 1 HOUR (CPT=76000)    Result Date: 9/17/2024    LOCATION:  [facility]    Dictated by (CST): Gigi Chun MD on 9/17/2024 at 6:46 PM     Finalized by (CST): Gigi Chun MD on 9/17/2024 at 6:46 PM       CT ABDOMEN+PELVIS(CONTRAST ONLY)(CPT=74177)    Result Date: 9/17/2024  CONCLUSION:  1. Moderate right-sided hydronephrosis with diffuse perinephric fat stranding secondary to a 1 cm obstructing stone proximally at the level of the ureteropelvic junction. 2. There is a distended gallbladder with 2 prominent gallstones near the gallbladder neck, the largest of which measures 2 cm.  No gallbladder wall thickening or biliary ductal dilatation noted.  No discrete CT evidence of acute cholecystitis. 3. Uncomplicated diverticulosis of the descending and sigmoid colon.    LOCATION:  [facility]   Dictated by (CST): Kamilah Bhat DO on 9/17/2024 at 0:01 AM     Finalized by (CST): Kamilah Bhat DO on 9/17/2024 at 0:05 AM        urine culture (+) e.coli    Assessment:  Patient Active Problem List   Diagnosis    Essential hypertension, benign    Unspecified asthma(493.90)    Obesity, unspecified    Hyperlipidemia    Allergic rhinitis    Family history of early CAD    Hypertension, uncontrolled    Obesity (BMI 30-39.9)    Gallstones    Renal colic    Intractable abdominal pain    Kidney stone       Right UPJ stone with acute UTI  POD 2 s/p right ureter stent placement    Plan:  Future ureteroscopy laser litho - reviewed post op stent, string removal possible  Estimated in the next couple of weeks  Course of antibiotics planned for 2 weeks  Ok to travel (she has a 4d trip to [place] next week)    Ever Mendoza MD  9/19/2024  6:50 AM

## 2024-09-19 NOTE — DISCHARGE SUMMARY
[facility] Internal Medicine Hospitalist Discharge Summary     Patient ID:  Michael Boston  64 year old  6/8/1960    Admit date: 9/16/2024    Discharge date: 9/19/24    Attending Physician: Jason Oliveira DO     Primary Care Physician: Joe Saunders MD     Discharge Diagnoses: 1cm Obstructing Ureteral Stone w/ Hydronephrosis    Discharge Condition: stable    Disposition:  Home    Important Follow Ups:  - PCP: 1-2 weeks  - Consults: Urology as recommended    Hospital Course:      Patient is a 64 year old female with PMH sig for HTN, HLD, here for abd pain. Found to have 1cm obstructing ureteral stone s/p cysto/stent placement on 9/17. Ucx grow pan-sensitive E.Coli. Switch to oral abx for 12 more days. Urology follow up for lithotripsy recommended. Found to have asymptomatic gallstones as well.    Consults: IP CONSULT TO UROLOGY  IP CONSULT TO HOSPITALIST    Operative Procedures: Procedure(s) (LRB):  CYSTOSCOPY, RIGHT URETEROSCOPY, RIGHT URETERAL STENT PLACEMENT (Right)       Patient instructions:      I as the attending physician reconciled the current and discharge medications on day of discharge.     Current Discharge Medication List        START taking these medications    Details   sulfamethoxazole-trimethoprim 400-80 MG Oral Tab Take 1 tablet by mouth every 12 (twelve) hours for 12 days.           CONTINUE these medications which have NOT CHANGED    Details   Fluticasone Propionate HFA (FLOVENT HFA IN) Inhale into the lungs.      lisinopril 10 MG Oral Tab Take 1 tablet (10 mg total) by mouth daily.      Albuterol Sulfate  (90 Base) MCG/ACT Inhalation Aero Soln Inhale 2 puffs into the lungs every 4 (four) hours as needed for Wheezing or Shortness of Breath.             Activity: activity as tolerated  Diet: regular diet  Wound Care: as directed  Code Status: No Order      Exam on day of discharge:     Vitals:    09/19/24 0515   BP: (!) 161/90    Pulse: 75   Resp: 16   Temp: 99.8 °F (37.7 °C)       General: no acute distress, AAOx3  Heart: regular rate and rhythm  Lungs: clear bilaterally, no active wheezing  Abdomen: nontender, nondistended  Extremities: no pedal edema   Neuro: no focal deficits      Total time coordinating care for discharge: Greater than 30 minutes    Jason Oliveira D.O.  [facility] Hospitalist

## 2024-09-19 NOTE — PROGRESS NOTES
NURSING DISCHARGE NOTE    Discharged Home via Ambulatory.  Accompanied by Family member  Belongings Taken by patient/family.    Wheelchair offered, pt declined.  Patient's VSS, tolerating diet, voiding adequately, pain controlled, tolerating ambulating, Discharge education provided, reviewed medications and follow up appointments. All questions answered and concerns addressed, patient verbalized understanding. Patient discharged in stable condition.

## 2024-09-19 NOTE — PROGRESS NOTES
Alert & oriented x4. VSS on room air. Voids, c/o urgency, straining urine. Tolerating diet. Ambulates independently. Denies nausea/chest pain/SOB. Pain controlled. Patient updated on plan of care. Questions and concerns addressed. Safety precautions in place. Frequent rounds performed.

## 2024-09-20 NOTE — PAYOR COMM NOTE
--------------  DISCHARGE REVIEW    Payor: [facility]   Subscriber #:  [id number]  Authorization Number: [id number]    Admit date: 9/17/24  Admit time:   1:35 AM  Discharge Date: 9/19/2024 11:40 AM     Admitting Physician: Marvin Horner MD  Attending Physician:  No att. providers found  Primary Care Physician: Joe Saunders MD          Discharge Summary Notes        Discharge Summary signed by Jason Oliveira DO at 9/19/2024 11:17 AM       Author: Jason Oliveira DO Specialty: Internal Medicine Author Type: Physician    Filed: 9/19/2024 11:17 AM Date of Service: 9/19/2024 11:15 AM Status: Signed    : Jason Oliveira DO (Physician)                                                          [facility] Internal Medicine Hospitalist Discharge Summary     Patient ID:  Michael Boston  64 year old  6/8/1960    Admit date: 9/16/2024    Discharge date: 9/19/24    Attending Physician: Jason Oliveira DO     Primary Care Physician: Joe Saunders MD     Discharge Diagnoses: 1cm Obstructing Ureteral Stone w/ Hydronephrosis    Discharge Condition: stable    Disposition:  Home    Important Follow Ups:  - PCP: 1-2 weeks  - Consults: Urology as recommended    Hospital Course:      Patient is a 64 year old female with PMH sig for HTN, HLD, here for abd pain. Found to have 1cm obstructing ureteral stone s/p cysto/stent placement on 9/17. Ucx grow pan-sensitive E.Coli. Switch to oral abx for 12 more days. Urology follow up for lithotripsy recommended. Found to have asymptomatic gallstones as well.    Consults: IP CONSULT TO UROLOGY  IP CONSULT TO HOSPITALIST    Operative Procedures: Procedure(s) (LRB):  CYSTOSCOPY, RIGHT URETEROSCOPY, RIGHT URETERAL STENT PLACEMENT (Right)       Patient instructions:      I as the attending physician reconciled the current and discharge medications on day of discharge.     Current Discharge Medication List        START taking these medications    Details   sulfamethoxazole-trimethoprim  400-80 MG Oral Tab Take 1 tablet by mouth every 12 (twelve) hours for 12 days.           CONTINUE these medications which have NOT CHANGED    Details   Fluticasone Propionate HFA (FLOVENT HFA IN) Inhale into the lungs.      lisinopril 10 MG Oral Tab Take 1 tablet (10 mg total) by mouth daily.      Albuterol Sulfate  (90 Base) MCG/ACT Inhalation Aero Soln Inhale 2 puffs into the lungs every 4 (four) hours as needed for Wheezing or Shortness of Breath.             Activity: activity as tolerated  Diet: regular diet  Wound Care: as directed  Code Status: No Order      Exam on day of discharge:     Vitals:    09/19/24 0515   BP: (!) 161/90   Pulse: 75   Resp: 16   Temp: 99.8 °F (37.7 °C)       General: no acute distress, AAOx3  Heart: regular rate and rhythm  Lungs: clear bilaterally, no active wheezing  Abdomen: nontender, nondistended  Extremities: no pedal edema   Neuro: no focal deficits      Total time coordinating care for discharge: Greater than 30 minutes    Jason Oliveira D.O.  [facility]           Electronically signed by Jason Oliveira DO on 9/19/2024 11:17 AM         REVIEWER COMMENTS

## (undated) DEVICE — PACK PBDS CYSTOSCOPY

## (undated) DEVICE — SLEEVE COMPR MD KNEE LEN SGL USE KENDALL SCD

## (undated) DEVICE — 20 ML SYRINGE LUER-LOCK TIP: Brand: MONOJECT

## (undated) DEVICE — 3M™ TEGADERM™ TRANSPARENT FILM DRESSING FRAME STYLE, 1626W, 4 IN X 4-3/4 IN (10 CM X 12 CM), 50/CT 4CT/CASE: Brand: 3M™ TEGADERM™

## (undated) DEVICE — SYRINGE MED 10ML LL TIP W/O SFTY DISP

## (undated) DEVICE — SEAL BIOPSY PORT ACMI BX BLACK CAP

## (undated) DEVICE — SOLUTION IRRIG 3000ML 0.9% NACL FLX CONT

## (undated) DEVICE — GLOVE SUR 7 SENSICARE PI PIP CRM PWD F

## (undated) DEVICE — CATHETER URET 5FR L70CM FLX OPN TIP NONPORTED

## (undated) DEVICE — GUIDEWIRE .038X150 STR ZIPWIRE

## (undated) NOTE — LETTER
[address]  [address]  Authorization for Surgical Operation and Procedure     Date:___________                                                                                                         Time:__________  I hereby authorize Surgeon(s):  Jose Bolden MD, my physician and his/her assistants (if applicable), which may include medical students, residents, and/or fellows, to perform the following surgical operation/ procedure and administer such anesthesia as may be determined necessary by my physician:  Operation/Procedure name (s) Procedure(s):  CYSTOSCOPY, RIGHT URETEROSCOPY, RIGHT URETERAL STENT PLACEMENT on Michael Boston   2.   I recognize that during the surgical operation/procedure, unforeseen conditions may necessitate additional or different procedures than those listed above.  I, therefore, further authorize and request that the above-named surgeon, assistants, or designees perform such procedures as are, in their judgment, necessary and desirable.    3.   My surgeon/physician has discussed prior to my surgery the potential benefits, risks and side effects of this procedure; the likelihood of achieving goals; and potential problems that might occur during recuperation.  They also discussed reasonable alternatives to the procedure, including risks, benefits, and side effects related to the alternatives and risks related to not receiving this procedure.  I have had all my questions answered and I acknowledge that no guarantee has been made as to the result that may be obtained.    4.   Should the need arise during my operation/procedure, which includes change of level of care prior to discharge, I also consent to the administration of blood and/or blood products.  Further, I understand that despite careful testing and screening of blood or blood products by collecting agencies, I may still be subject to ill effects as a result of receiving a blood  transfusion and/or blood products.  The following are some, but not all, of the potential risks that can occur: fever and allergic reactions, hemolytic reactions, transmission of diseases such as Hepatitis, AIDS and Cytomegalovirus (CMV) and fluid overload.  In the event that I wish to have an autologous transfusion of my own blood, or a directed donor transfusion, I will discuss this with my physician.  Check only if Refusing Blood or Blood Products  I understand refusal of blood or blood products as deemed necessary by my physician may have serious consequences to my condition to include possible death. I hereby assume responsibility for my refusal and release the hospital, its personnel, and my physicians from any responsibility for the consequences of my refusal.          o  Refuse      5.   I authorize the use of any specimen, organs, tissues, body parts or foreign objects that may be removed from my body during the operation/procedure for diagnosis, research or teaching purposes and their subsequent disposal by hospital authorities.  I also authorize the release of specimen test results and/or written reports to my treating physician on the hospital medical staff or other referring or consulting physicians involved in my care, at the discretion of the Pathologist or my treating physician.    6.   I consent to the photographing or videotaping of the operations or procedures to be performed, including appropriate portions of my body for medical, scientific, or educational purposes, provided my identity is not revealed by the pictures or by descriptive texts accompanying them.  If the procedure has been photographed/videotaped, the surgeon will obtain the original picture, image, videotape or CD.  The hospital will not be responsible for storage, release or maintenance of the picture, image, tape or CD.    7.   I consent to the presence of a  or observers in the operating room as deemed  necessary by my physician or their designees.    8.   I recognize that in the event my procedure results in extended X-Ray/fluoroscopy time, I may develop a skin reaction.    9. If I have a Do Not Attempt Resuscitation (DNAR) order in place, that status will be suspended while in the operating room, procedural suite, and during the recovery period unless otherwise explicitly stated by me (or a person authorized to consent on my behalf). The surgeon or my attending physician will determine when the applicable recovery period ends for purposes of reinstating the DNAR order.  10. Patients having a sterilization procedure: I understand that if the procedure is successful the results will be permanent and it will therefore be impossible for me to inseminate, conceive, or bear children.  I also understand that the procedure is intended to result in sterility, although the result has not been guaranteed.   11. I acknowledge that my physician has explained sedation/analgesia administration to me including the risk and benefits I consent to the administration of sedation/analgesia as may be necessary or desirable in the judgment of my physician.    I CERTIFY THAT I HAVE READ AND FULLY UNDERSTAND THE ABOVE CONSENT TO OPERATION and/or OTHER PROCEDURE.    _________________________________________  __________________________________  Signature of Patient     Signature of Responsible Person         ___________________________________         Printed Name of Responsible Person           _________________________________                 Relationship to Patient  _________________________________________  ______________________________  Signature of Witness          Date  Time      Patient Name: Daphneysimran CALDERÓN Darvin     : 1960                 Printed: 2024     Medical Record #: [id number]                     Page 1 of 2                                    [address]   [address]    Consent for Anesthesia    IMichael agree to be cared for by an anesthesiologist, who is specially trained to monitor me and give me medicine to put me to sleep or keep me comfortable during my procedure    I understand that my anesthesiologist is not an employee or agent of [facility] or [employer]. He or she works for [employer].    As the patient asking for anesthesia services, I agree to:  Allow the anesthesiologist (anesthesia doctor) to give me medicine and do additional procedures as necessary. Some examples are: Starting or using an “IV” to give me medicine, fluids or blood during my procedure, and having a breathing tube placed to help me breathe when I’m asleep (intubation). In the event that my heart stops working properly, I understand that my anesthesiologist will make every effort to sustain my life, unless otherwise directed by [facility] Do Not Resuscitate documents.  Tell my anesthesia doctor before my procedure:  If I am pregnant.  The last time that I ate or drank.  All of the medicines I take (including prescriptions, herbal supplements, and pills I can buy without a prescription (including street drugs/illegal medications). Failure to inform my anesthesiologist about these medicines may increase my risk of anesthetic complications.  If I am allergic to anything or have had a reaction to anesthesia before.  I understand how the anesthesia medicine will help me (benefits).  I understand that with any type of anesthesia medicine there are risks:  The most common risks are: nausea, vomiting, sore throat, muscle soreness, damage to my eyes, mouth, or teeth (from breathing tube placement).  Rare risks include: remembering what happened during my procedure, allergic reactions to medications, injury to my airway, heart, lungs, vision, nerves, or muscles and in extremely rare instances death.  My doctor has explained to me other choices  available to me for my care (alternatives).  Pregnant Patients (“epidural”):  I understand that the risks of having an epidural (medicine given into my back to help control pain during labor), include itching, low blood pressure, difficulty urinating, headache or slowing of the baby’s heart. Very rare risks include infection, bleeding, seizure, irregular heart rhythms and nerve injury.  Regional Anesthesia (“spinal”, “epidural”, & “nerve blocks”):  I understand that rare but potential complications include headache, bleeding, infection, seizure, irregular heart rhythms, and nerve injury.    I can change my mind about having anesthesia services at any time before I get the medicine.    _____________________________________________________________________________  Patient (or Representative) Signature/Relationship to Patient  Date   Time    _____________________________________________________________________________   Name (if used)    Language/Organization   Time    _____________________________________________________________________________  Anesthesiologist Signature     Date   Time  I have discussed the procedure and information above with the patient (or patient’s representative) and answered their questions. The patient or their representative has agreed to have anesthesia services.    _____________________________________________________________________________  Witness        Date   Time  I have verified that the signature is that of the patient or patient’s representative, and that it was signed before the procedure  Patient Name: Michael Boston     : 1960                 Printed: 2024     Medical Record #: [id number]                     Page 2 of 2

## (undated) NOTE — ED AVS SNAPSHOT
Alton Le   MRN: [id number]    Department:  [facility] Emergency Department   Date of Visit:  10/18/2019           Disclosure     Insurance plans vary and the physician(s) referred by the ER may not be covered by your plan.  Please contact tell this physician (or your personal doctor if your instructions are to return to your personal doctor) about any new or lasting problems. The primary care or specialist physician will see patients referred from the [facility] Emergency Department.  Kiah Mcdonough